# Patient Record
Sex: FEMALE | URBAN - METROPOLITAN AREA
[De-identification: names, ages, dates, MRNs, and addresses within clinical notes are randomized per-mention and may not be internally consistent; named-entity substitution may affect disease eponyms.]

---

## 2024-02-07 ENCOUNTER — TELEPHONE (OUTPATIENT)
Dept: OBGYN CLINIC | Facility: CLINIC | Age: 34
End: 2024-02-07

## 2024-02-07 NOTE — TELEPHONE ENCOUNTER
Patient's friend called, new patient says she is pregnant. Does not know lmp but was in December.

## 2024-02-08 NOTE — TELEPHONE ENCOUNTER
Patient prefers to come to [place] office. She has no insurance and is self pay. Patient was refusing to go to [facility] at first but took down their phone number because it will be a more cost effective option for her. She still wants to be called if we have something available in the [place] office. Per patient her LMP is 12/15/23.

## 2024-02-15 ENCOUNTER — HOSPITAL ENCOUNTER (EMERGENCY)
Facility: HOSPITAL | Age: 34
Discharge: HOME | End: 2024-02-15
Attending: EMERGENCY MEDICINE
Payer: COMMERCIAL

## 2024-02-15 ENCOUNTER — APPOINTMENT (EMERGENCY)
Dept: RADIOLOGY | Facility: HOSPITAL | Age: 34
End: 2024-02-15
Payer: COMMERCIAL

## 2024-02-15 VITALS
TEMPERATURE: 98.9 F | HEART RATE: 82 BPM | OXYGEN SATURATION: 98 % | SYSTOLIC BLOOD PRESSURE: 120 MMHG | WEIGHT: 182 LBS | RESPIRATION RATE: 18 BRPM | DIASTOLIC BLOOD PRESSURE: 76 MMHG

## 2024-02-15 DIAGNOSIS — O46.8X1 SUBCHORIONIC HEMORRHAGE IN FIRST TRIMESTER: ICD-10-CM

## 2024-02-15 DIAGNOSIS — O20.0 THREATENED MISCARRIAGE IN EARLY PREGNANCY: ICD-10-CM

## 2024-02-15 DIAGNOSIS — O46.90 VAGINAL BLEEDING DURING PREGNANCY: Primary | ICD-10-CM

## 2024-02-15 DIAGNOSIS — O41.8X10 SUBCHORIONIC HEMORRHAGE IN FIRST TRIMESTER: ICD-10-CM

## 2024-02-15 LAB
ABO GROUP BLD: NORMAL
B-HCG SERPL-ACNC: ABNORMAL MIU/ML (ref 0–5)
BACTERIA UR QL AUTO: ABNORMAL /HPF
BASOPHILS # BLD AUTO: 0.12 THOUSANDS/ÂΜL (ref 0–0.1)
BASOPHILS NFR BLD AUTO: 1 % (ref 0–1)
BILIRUB UR QL STRIP: NEGATIVE
CLARITY UR: ABNORMAL
COLOR UR: YELLOW
EOSINOPHIL # BLD AUTO: 0.53 THOUSAND/ÂΜL (ref 0–0.61)
EOSINOPHIL NFR BLD AUTO: 5 % (ref 0–6)
ERYTHROCYTE [DISTWIDTH] IN BLOOD BY AUTOMATED COUNT: 15.1 % (ref 11.6–15.1)
EXT PREGNANCY TEST URINE: POSITIVE
EXT. CONTROL: ABNORMAL
GLUCOSE UR STRIP-MCNC: NEGATIVE MG/DL
HCT VFR BLD AUTO: 37.3 % (ref 34.8–46.1)
HGB BLD-MCNC: 12.9 G/DL (ref 11.5–15.4)
HGB UR QL STRIP.AUTO: ABNORMAL
IMM GRANULOCYTES # BLD AUTO: 0.04 THOUSAND/UL (ref 0–0.2)
IMM GRANULOCYTES NFR BLD AUTO: 0 % (ref 0–2)
KETONES UR STRIP-MCNC: ABNORMAL MG/DL
LEUKOCYTE ESTERASE UR QL STRIP: NEGATIVE
LYMPHOCYTES # BLD AUTO: 2.38 THOUSANDS/ÂΜL (ref 0.6–4.47)
LYMPHOCYTES NFR BLD AUTO: 23 % (ref 14–44)
MCH RBC QN AUTO: 28 PG (ref 26.8–34.3)
MCHC RBC AUTO-ENTMCNC: 34.6 G/DL (ref 31.4–37.4)
MCV RBC AUTO: 81 FL (ref 82–98)
MONOCYTES # BLD AUTO: 0.86 THOUSAND/ÂΜL (ref 0.17–1.22)
MONOCYTES NFR BLD AUTO: 8 % (ref 4–12)
MUCOUS THREADS UR QL AUTO: ABNORMAL
NEUTROPHILS # BLD AUTO: 6.39 THOUSANDS/ÂΜL (ref 1.85–7.62)
NEUTS SEG NFR BLD AUTO: 63 % (ref 43–75)
NITRITE UR QL STRIP: NEGATIVE
NON-SQ EPI CELLS URNS QL MICRO: ABNORMAL /HPF
NRBC BLD AUTO-RTO: 0 /100 WBCS
PH UR STRIP.AUTO: 6 [PH]
PLATELET # BLD AUTO: 238 THOUSANDS/UL (ref 149–390)
PMV BLD AUTO: 11 FL (ref 8.9–12.7)
PROT UR STRIP-MCNC: NEGATIVE MG/DL
RBC # BLD AUTO: 4.61 MILLION/UL (ref 3.81–5.12)
RBC #/AREA URNS AUTO: ABNORMAL /HPF
RH BLD: POSITIVE
SP GR UR STRIP.AUTO: 1.02 (ref 1–1.03)
UROBILINOGEN UR QL STRIP.AUTO: 0.2 E.U./DL
WBC # BLD AUTO: 10.32 THOUSAND/UL (ref 4.31–10.16)
WBC #/AREA URNS AUTO: ABNORMAL /HPF

## 2024-02-15 PROCEDURE — 99284 EMERGENCY DEPT VISIT MOD MDM: CPT

## 2024-02-15 PROCEDURE — 36415 COLL VENOUS BLD VENIPUNCTURE: CPT | Performed by: EMERGENCY MEDICINE

## 2024-02-15 PROCEDURE — 86901 BLOOD TYPING SEROLOGIC RH(D): CPT | Performed by: EMERGENCY MEDICINE

## 2024-02-15 PROCEDURE — 81001 URINALYSIS AUTO W/SCOPE: CPT | Performed by: EMERGENCY MEDICINE

## 2024-02-15 PROCEDURE — 81025 URINE PREGNANCY TEST: CPT | Performed by: EMERGENCY MEDICINE

## 2024-02-15 PROCEDURE — 86900 BLOOD TYPING SEROLOGIC ABO: CPT | Performed by: EMERGENCY MEDICINE

## 2024-02-15 PROCEDURE — 96360 HYDRATION IV INFUSION INIT: CPT

## 2024-02-15 PROCEDURE — 85025 COMPLETE CBC W/AUTO DIFF WBC: CPT | Performed by: EMERGENCY MEDICINE

## 2024-02-15 PROCEDURE — 99284 EMERGENCY DEPT VISIT MOD MDM: CPT | Performed by: EMERGENCY MEDICINE

## 2024-02-15 PROCEDURE — 84702 CHORIONIC GONADOTROPIN TEST: CPT | Performed by: EMERGENCY MEDICINE

## 2024-02-15 PROCEDURE — 76801 OB US < 14 WKS SINGLE FETUS: CPT

## 2024-02-15 RX ADMIN — SODIUM CHLORIDE 1000 ML: 0.9 INJECTION, SOLUTION INTRAVENOUS at 17:31

## 2024-02-15 NOTE — ED PROVIDER NOTES
History  Chief Complaint   Patient presents with    Vaginal Bleeding - Pregnant     Pt is approx. 8 weeks pregnant and is having vaginal bleeding. She had some bleeding last week as well. C/o abdominal cramping.      34 yo female  at 8 and 6/7 by LMP approx. 12/15/2023.  Pt. C/o episode of mild vaginal bleeding last week x one day and then again today but worse.  + associated mild lower abdominal cramps off and on.  No fever, cough, vomiting, dizziness, passing out.  + feels weak and tired.  Has not seen OB yet but did call for appointment it appears on EPIC.      History provided by:  Patient   used: Yes        None       History reviewed. No pertinent past medical history.    History reviewed. No pertinent surgical history.    History reviewed. No pertinent family history.  I have reviewed and agree with the history as documented.    E-Cigarette/Vaping    E-Cigarette Use Never User      E-Cigarette/Vaping Substances    Nicotine No     THC No     CBD No     Flavoring No     Other No     Unknown No      Social History     Tobacco Use    Smoking status: Never    Smokeless tobacco: Never   Vaping Use    Vaping status: Never Used   Substance Use Topics    Alcohol use: Never    Drug use: Never       Review of Systems   Constitutional:  Positive for fatigue. Negative for fever.   Respiratory:  Negative for cough.    Gastrointestinal:  Negative for diarrhea and vomiting.   Genitourinary:  Positive for pelvic pain and vaginal bleeding.   Skin:  Negative for rash.   Neurological:  Positive for weakness. Negative for dizziness and syncope.       Physical Exam  Physical Exam  Vitals and nursing note reviewed.   Constitutional:       General: She is not in acute distress.     Appearance: She is well-developed. She is not ill-appearing or diaphoretic.   HENT:      Head: Normocephalic and atraumatic.   Eyes:      General: No scleral icterus.     Conjunctiva/sclera: Conjunctivae normal.   Cardiovascular:       Rate and Rhythm: Normal rate and regular rhythm.      Heart sounds: Normal heart sounds. No murmur heard.  Pulmonary:      Effort: Pulmonary effort is normal. No respiratory distress.      Breath sounds: Normal breath sounds.   Abdominal:      General: Bowel sounds are normal. There is no distension.      Palpations: Abdomen is soft.      Tenderness: There is abdominal tenderness.      Comments: Very mild ttp suprapubic   Musculoskeletal:         General: No deformity. Normal range of motion.      Cervical back: Normal range of motion and neck supple.      Right lower leg: No edema.      Left lower leg: No edema.   Skin:     General: Skin is warm and dry.      Coloration: Skin is not pale.      Findings: No rash.   Neurological:      Mental Status: She is alert.      Cranial Nerves: No cranial nerve deficit.   Psychiatric:         Behavior: Behavior normal.         Vital Signs  ED Triage Vitals [02/15/24 1546]   Temperature Pulse Respirations Blood Pressure SpO2   98.9 °F (37.2 °C) (!) 110 20 126/77 98 %      Temp src Heart Rate Source Patient Position - Orthostatic VS BP Location FiO2 (%)   -- -- -- -- --      Pain Score       4           Vitals:    02/15/24 1546 02/15/24 1930 02/15/24 2032   BP: 126/77 123/63 120/76   Pulse: (!) 110 94 82         Visual Acuity      ED Medications  Medications   sodium chloride 0.9 % bolus 1,000 mL (0 mL Intravenous Stopped 2/15/24 1831)       Diagnostic Studies  Results Reviewed       Procedure Component Value Units Date/Time    Urine Microscopic [[id number]]  (Abnormal) Collected: 02/15/24 1732    Lab Status: Final result Specimen: Urine, Clean Catch Updated: 02/15/24 1840     RBC, UA 30-50 /hpf      WBC, UA 0-1 /hpf      Epithelial Cells Occasional /hpf      Bacteria, UA Occasional /hpf      MUCUS THREADS Occasional    Quantitative hCG [[id number]]  (Abnormal) Collected: 02/15/24 1729    Lab Status: Final result Specimen: Blood from Arm, Left Updated: 02/15/24 1823     HCG,  Quant 192,488 mIU/mL     Narrative:       Expected Ranges:    HCG results between 5 and 25 mIU/mL may be indicative of early pregnancy but should be interpreted in light of the total clinical presentation.    HCG can rise to detectable levels in janay and post menopausal women (0-11.6 mIU/mL).     Approximate               Approximate HCG  Gestation age          Concentration ( mIU/mL)  _____________          ______________________   Weeks                      HCG values  0.2-1                       5-50  1-2                           2-3                         100-5000  3-4                         500-82918  4-5                         1000-14775  5-6                         [id number]  6-8                         52090-176724  8-12                        [id number]      POCT pregnancy, urine [[id number]]  (Abnormal) Resulted: 02/15/24 1740    Lab Status: Final result Updated: 02/15/24 1740     EXT Preg Test, Ur Positive     Control Valid    UA (URINE) with reflex to Scope [[id number]]  (Abnormal) Collected: 02/15/24 1732    Lab Status: Final result Specimen: Urine, Clean Catch Updated: 02/15/24 1740     Color, UA Yellow     Clarity, UA Slightly Cloudy     Specific Gravity, UA 1.025     pH, UA 6.0     Leukocytes, UA Negative     Nitrite, UA Negative     Protein, UA Negative mg/dl      Glucose, UA Negative mg/dl      Ketones, UA >=80 (3+) mg/dl      Urobilinogen, UA 0.2 E.U./dl      Bilirubin, UA Negative     Occult Blood, UA Large    CBC and differential [[id number]]  (Abnormal) Collected: 02/15/24 1729    Lab Status: Final result Specimen: Blood from Arm, Left Updated: 02/15/24 1738     WBC 10.32 Thousand/uL      RBC 4.61 Million/uL      Hemoglobin 12.9 g/dL      Hematocrit 37.3 %      MCV 81 fL      MCH 28.0 pg      MCHC 34.6 g/dL      RDW 15.1 %      MPV 11.0 fL      Platelets 238 Thousands/uL      nRBC 0 /100 WBCs      Neutrophils Relative 63 %      Immat GRANS % 0 %      Lymphocytes Relative 23 %       Monocytes Relative 8 %      Eosinophils Relative 5 %      Basophils Relative 1 %      Neutrophils Absolute 6.39 Thousands/µL      Immature Grans Absolute 0.04 Thousand/uL      Lymphocytes Absolute 2.38 Thousands/µL      Monocytes Absolute 0.86 Thousand/µL      Eosinophils Absolute 0.53 Thousand/µL      Basophils Absolute 0.12 Thousands/µL                    US OB < 14 weeks with transvaginal   Final Result by Angel Luis Marshall MD (02/15 2005)      Single live intrauterine gestation at 8 weeks 2 days (range +/- 5 days).      MATTHEW of 09/24/2024.      Two subchorionic hemorrhages, which may present two components of a single long thin subchorionic hemorrhage with the longest measurement of approximately 7 cm.         Workstation performed: [id number]                    Procedures  Procedures         ED Course  ED Course as of 02/15/24 2056   Thu Feb 15, 2024   1845 Pt. Waiting for ultrasound to be done.                                 SBIRT 20yo+      Flowsheet Row Most Recent Value   Initial Alcohol Screen: US AUDIT-C     1. How often do you have a drink containing alcohol? 0 Filed at: 02/15/2024 1549   2. How many drinks containing alcohol do you have on a typical day you are drinking?  0 Filed at: 02/15/2024 1549   3a. Male UNDER 65: How often do you have five or more drinks on one occasion? 0 Filed at: 02/15/2024 1549   3b. FEMALE Any Age, or MALE 65+: How often do you have 4 or more drinks on one occassion? 0 Filed at: 02/15/2024 1549   Audit-C Score 0 Filed at: 02/15/2024 1549                      Medical Decision Making  1810 - US ordered, quant pending, WBC/hemoglobin ok, UA with ketones - getting IVF.  She is O positive so will not need Rhogam.    Quant good, US report shows live IUP with subchorionic hemorrhage.  Pt. Advised rest, nothing in the vagina and follow up outpt. OB.    Amount and/or Complexity of Data Reviewed  Labs: ordered.  Radiology: ordered.             Disposition  Final diagnoses:   Vaginal  bleeding during pregnancy   Subchorionic hemorrhage in first trimester   Threatened miscarriage in early pregnancy     Time reflects when diagnosis was documented in both MDM as applicable and the Disposition within this note       Time User Action Codes Description Comment    2/15/2024  6:16 PM Margoth Jain Add [O46.90] Vaginal bleeding during pregnancy     2/15/2024  8:16 PM Margoth Jain Add [O41.8X10,  O46.8X1] Subchorionic hemorrhage in first trimester     2/15/2024  8:17 PM Margoth Jain Add [O20.0] Threatened miscarriage in early pregnancy           ED Disposition       ED Disposition   Discharge    Condition   Stable    Date/Time   Thu Feb 15, 2024 2016    Comment   Buffy Pope discharge to home/self care.                   Follow-up Information       Follow up With Specialties Details Why Contact Info    Franchesca Tillman MD Obstetrics and Gynecology, Obstetrics, Gynecology Schedule an appointment as soon as possible for a visit   [address] [place] [address] [id number] [phone]              There are no discharge medications for this patient.      No discharge procedures on file.    PDMP Review       None            ED Provider  Electronically Signed by             Margoth Jain MD  02/15/24 6399

## 2024-02-16 NOTE — DISCHARGE INSTRUCTIONS
Right now your blood work is good and the baby is ok and in the uterus.  There is some bleeding in the pregnancy which puts you at a little higher risk to miscarry but most pregnancies with this are fine.  You do need to see an OB doctor - please call and get an appointment.  In the meantime, rest, no heavy lifting or exertion, nothing in the vagina.

## 2024-10-01 ENCOUNTER — HOSPITAL ENCOUNTER (INPATIENT)
Facility: HOSPITAL | Age: 34
LOS: 4 days | Discharge: HOME | End: 2024-10-05
Attending: OBSTETRICS & GYNECOLOGY | Admitting: STUDENT IN AN ORGANIZED HEALTH CARE EDUCATION/TRAINING PROGRAM

## 2024-10-01 ENCOUNTER — ANESTHESIA (INPATIENT)
Dept: LABOR AND DELIVERY | Facility: HOSPITAL | Age: 34
End: 2024-10-01

## 2024-10-01 ENCOUNTER — APPOINTMENT (INPATIENT)
Dept: CT IMAGING | Facility: HOSPITAL | Age: 34
End: 2024-10-01

## 2024-10-01 ENCOUNTER — APPOINTMENT (INPATIENT)
Dept: RADIOLOGY | Facility: HOSPITAL | Age: 34
End: 2024-10-01

## 2024-10-01 ENCOUNTER — ANESTHESIA EVENT (INPATIENT)
Dept: PERIOP | Facility: HOSPITAL | Age: 34
End: 2024-10-01

## 2024-10-01 ENCOUNTER — HOSPITAL ENCOUNTER (EMERGENCY)
Facility: HOSPITAL | Age: 34
End: 2024-10-01
Attending: EMERGENCY MEDICINE
Payer: COMMERCIAL

## 2024-10-01 ENCOUNTER — ANESTHESIA (INPATIENT)
Dept: PERIOP | Facility: HOSPITAL | Age: 34
End: 2024-10-01

## 2024-10-01 ENCOUNTER — ANESTHESIA EVENT (INPATIENT)
Dept: LABOR AND DELIVERY | Facility: HOSPITAL | Age: 34
End: 2024-10-01

## 2024-10-01 VITALS
RESPIRATION RATE: 20 BRPM | DIASTOLIC BLOOD PRESSURE: 94 MMHG | HEART RATE: 105 BPM | OXYGEN SATURATION: 98 % | SYSTOLIC BLOOD PRESSURE: 144 MMHG

## 2024-10-01 DIAGNOSIS — Z90.711 STATUS POST ABDOMINAL SUPRACERVICAL SUBTOTAL HYSTERECTOMY: ICD-10-CM

## 2024-10-01 DIAGNOSIS — Z98.891 STATUS POST PRIMARY LOW TRANSVERSE CESAREAN SECTION: ICD-10-CM

## 2024-10-01 DIAGNOSIS — Z34.90 PREGNANCY: Primary | ICD-10-CM

## 2024-10-01 DIAGNOSIS — R58 HEMORRHAGE: ICD-10-CM

## 2024-10-01 DIAGNOSIS — O32.2XX0: ICD-10-CM

## 2024-10-01 PROBLEM — D62 ACUTE BLOOD LOSS ANEMIA: Status: ACTIVE | Noted: 2024-10-01

## 2024-10-01 PROBLEM — O09.30 NO PRENATAL CARE IN CURRENT PREGNANCY: Status: ACTIVE | Noted: 2024-10-01

## 2024-10-01 LAB
ABO GROUP BLD BPU: NORMAL
ABO GROUP BLD: NORMAL
ALBUMIN SERPL BCG-MCNC: 2.7 G/DL (ref 3.5–5)
ALBUMIN SERPL BCG-MCNC: 3.3 G/DL (ref 3.5–5)
ALP SERPL-CCNC: 188 U/L (ref 34–104)
ALP SERPL-CCNC: 89 U/L (ref 34–104)
ALT SERPL W P-5'-P-CCNC: 10 U/L (ref 7–52)
ALT SERPL W P-5'-P-CCNC: 8 U/L (ref 7–52)
ANION GAP SERPL CALCULATED.3IONS-SCNC: 12 MMOL/L (ref 4–13)
ANION GAP SERPL CALCULATED.3IONS-SCNC: 6 MMOL/L (ref 4–13)
ANISOCYTOSIS BLD QL SMEAR: PRESENT
APTT PPP: 26 SECONDS (ref 23–34)
AST SERPL W P-5'-P-CCNC: 15 U/L (ref 13–39)
AST SERPL W P-5'-P-CCNC: 21 U/L (ref 13–39)
BASE EXCESS BLDA CALC-SCNC: -12 MMOL/L (ref -2–3)
BASE EXCESS BLDA CALC-SCNC: -13 MMOL/L (ref -2–3)
BASE EXCESS BLDA CALC-SCNC: -4.5 MMOL/L
BASE EXCESS BLDCOA CALC-SCNC: -4.1 MMOL/L (ref 3–11)
BASE EXCESS BLDCOV CALC-SCNC: -7.8 MMOL/L (ref 1–9)
BASOPHILS # BLD AUTO: 0.07 THOUSANDS/ΜL (ref 0–0.1)
BASOPHILS # BLD MANUAL: 0 THOUSAND/UL (ref 0–0.1)
BASOPHILS NFR BLD AUTO: 1 % (ref 0–1)
BASOPHILS NFR MAR MANUAL: 0 % (ref 0–1)
BILIRUB SERPL-MCNC: 0.43 MG/DL (ref 0.2–1)
BILIRUB SERPL-MCNC: 1.05 MG/DL (ref 0.2–1)
BLD GP AB SCN SERPL QL: NEGATIVE
BPU ID: NORMAL
BUN SERPL-MCNC: 10 MG/DL (ref 5–25)
BUN SERPL-MCNC: 9 MG/DL (ref 5–25)
CA-I BLD-SCNC: 0.55 MMOL/L (ref 1.12–1.32)
CA-I BLD-SCNC: 0.91 MMOL/L (ref 1.12–1.32)
CA-I BLD-SCNC: 1.16 MMOL/L (ref 1.12–1.32)
CA-I BLD-SCNC: 1.17 MMOL/L (ref 1.12–1.32)
CA-I BLD-SCNC: 1.2 MMOL/L (ref 1.12–1.32)
CALCIUM ALBUM COR SERPL-MCNC: 9.2 MG/DL (ref 8.3–10.1)
CALCIUM ALBUM COR SERPL-MCNC: 9.4 MG/DL (ref 8.3–10.1)
CALCIUM SERPL-MCNC: 8.4 MG/DL (ref 8.4–10.2)
CALCIUM SERPL-MCNC: 8.6 MG/DL (ref 8.4–10.2)
CFFMA (FUNCTIONAL FIBRINOGEN MAX AMPLITUDE): 17.1 MM (ref 15–32)
CFFMA (FUNCTIONAL FIBRINOGEN MAX AMPLITUDE): 19.2 MM (ref 15–32)
CHLORIDE SERPL-SCNC: 105 MMOL/L (ref 96–108)
CHLORIDE SERPL-SCNC: 110 MMOL/L (ref 96–108)
CKLY30: 0 % (ref 0–2.6)
CKLY30: 0 % (ref 0–2.6)
CKR(REACTION TIME): 4.2 MIN (ref 4.6–9.1)
CKR(REACTION TIME): 4.6 MIN (ref 4.6–9.1)
CO2 SERPL-SCNC: 17 MMOL/L (ref 21–32)
CO2 SERPL-SCNC: 20 MMOL/L (ref 21–32)
CREAT SERPL-MCNC: 0.51 MG/DL (ref 0.6–1.3)
CREAT SERPL-MCNC: 0.59 MG/DL (ref 0.6–1.3)
CROSSMATCH: NORMAL
CRTMA(RAPIDTEG MAX AMPLITUDE): 58.5 MM (ref 52–70)
CRTMA(RAPIDTEG MAX AMPLITUDE): 60.9 MM (ref 52–70)
EOSINOPHIL # BLD AUTO: 0.27 THOUSAND/ΜL (ref 0–0.61)
EOSINOPHIL # BLD MANUAL: 0 THOUSAND/UL (ref 0–0.4)
EOSINOPHIL NFR BLD AUTO: 3 % (ref 0–6)
EOSINOPHIL NFR BLD MANUAL: 0 % (ref 0–6)
ERYTHROCYTE [DISTWIDTH] IN BLOOD BY AUTOMATED COUNT: 16 % (ref 11.6–15.1)
ERYTHROCYTE [DISTWIDTH] IN BLOOD BY AUTOMATED COUNT: 16.3 % (ref 11.6–15.1)
ERYTHROCYTE [DISTWIDTH] IN BLOOD BY AUTOMATED COUNT: 16.9 % (ref 11.6–15.1)
ERYTHROCYTE [DISTWIDTH] IN BLOOD BY AUTOMATED COUNT: 18.2 % (ref 11.6–15.1)
ERYTHROCYTE [DISTWIDTH] IN BLOOD BY AUTOMATED COUNT: 18.2 % (ref 11.6–15.1)
FIBRINOGEN PPP-MCNC: 284 MG/DL (ref 206–523)
FIBRINOGEN PPP-MCNC: 298 MG/DL (ref 206–523)
FIBRINOGEN PPP-MCNC: 318 MG/DL (ref 206–523)
GFR SERPL CREATININE-BSD FRML MDRD: 119 ML/MIN/1.73SQ M
GFR SERPL CREATININE-BSD FRML MDRD: 125 ML/MIN/1.73SQ M
GLUCOSE SERPL-MCNC: 117 MG/DL (ref 65–140)
GLUCOSE SERPL-MCNC: 163 MG/DL (ref 65–140)
GLUCOSE SERPL-MCNC: 200 MG/DL (ref 65–140)
GLUCOSE SERPL-MCNC: 88 MG/DL (ref 65–140)
GLUCOSE SERPL-MCNC: 96 MG/DL (ref 65–140)
HBV SURFACE AB SER-ACNC: <3 MIU/ML
HBV SURFACE AG SER QL: NORMAL
HCO3 BLDA-SCNC: 13.9 MMOL/L (ref 22–28)
HCO3 BLDA-SCNC: 14.8 MMOL/L (ref 22–28)
HCO3 BLDA-SCNC: 19.6 MMOL/L (ref 22–28)
HCO3 BLDCOA-SCNC: 20.9 MMOL/L (ref 17.3–27.3)
HCO3 BLDCOV-SCNC: 21.1 MMOL/L (ref 12.2–28.6)
HCT VFR BLD AUTO: 22.7 % (ref 34.8–46.1)
HCT VFR BLD AUTO: 24.2 % (ref 34.8–46.1)
HCT VFR BLD AUTO: 26.1 % (ref 34.8–46.1)
HCT VFR BLD AUTO: 26.2 % (ref 34.8–46.1)
HCT VFR BLD AUTO: 29.8 % (ref 34.8–46.1)
HCT VFR BLD AUTO: 30.5 % (ref 34.8–46.1)
HCT VFR BLD CALC: 24 % (ref 34.8–46.1)
HCT VFR BLD CALC: 25 % (ref 34.8–46.1)
HCT VFR BLD CALC: <15 % (ref 34.8–46.1)
HCV AB SER QL: NORMAL
HGB BLD-MCNC: 10.5 G/DL (ref 11.5–15.4)
HGB BLD-MCNC: 6.3 G/DL (ref 11.5–15.4)
HGB BLD-MCNC: 7 G/DL (ref 11.5–15.4)
HGB BLD-MCNC: 7.4 G/DL (ref 11.5–15.4)
HGB BLD-MCNC: 9 G/DL (ref 11.5–15.4)
HGB BLD-MCNC: 9.2 G/DL (ref 11.5–15.4)
HGB BLD-MCNC: 9.5 G/DL (ref 11.5–15.4)
HGB BLDA-MCNC: 8.2 G/DL (ref 11.5–15.4)
HGB BLDA-MCNC: 8.5 G/DL (ref 11.5–15.4)
HIV 1+2 AB+HIV1 P24 AG SERPL QL IA: NORMAL
HIV 2 AB SERPL QL IA: NORMAL
HIV1 AB SERPL QL IA: NORMAL
HIV1 P24 AG SERPL QL IA: NORMAL
HOLD SPECIMEN: NORMAL
IMM GRANULOCYTES # BLD AUTO: 0.1 THOUSAND/UL (ref 0–0.2)
IMM GRANULOCYTES NFR BLD AUTO: 1 % (ref 0–2)
INR PPP: 1.02 (ref 0.85–1.19)
INR PPP: 1.07 (ref 0.85–1.19)
INR PPP: 1.08 (ref 0.85–1.19)
LACTATE SERPL-SCNC: 1.7 MMOL/L (ref 0.5–2)
LACTATE SERPL-SCNC: 1.9 MMOL/L (ref 0.5–2)
LACTATE SERPL-SCNC: 2.6 MMOL/L (ref 0.5–2)
LYMPHOCYTES # BLD AUTO: 1.8 THOUSAND/UL (ref 0.6–4.47)
LYMPHOCYTES # BLD AUTO: 15 % (ref 14–44)
LYMPHOCYTES # BLD AUTO: 2.73 THOUSANDS/ΜL (ref 0.6–4.47)
LYMPHOCYTES NFR BLD AUTO: 30 % (ref 14–44)
MAGNESIUM SERPL-MCNC: 1.5 MG/DL (ref 1.9–2.7)
MAGNESIUM SERPL-MCNC: 1.7 MG/DL (ref 1.9–2.7)
MCH RBC QN AUTO: 23.6 PG (ref 26.8–34.3)
MCH RBC QN AUTO: 23.7 PG (ref 26.8–34.3)
MCH RBC QN AUTO: 24.3 PG (ref 26.8–34.3)
MCH RBC QN AUTO: 28.8 PG (ref 26.8–34.3)
MCH RBC QN AUTO: 29.2 PG (ref 26.8–34.3)
MCHC RBC AUTO-ENTMCNC: 30.6 G/DL (ref 31.4–37.4)
MCHC RBC AUTO-ENTMCNC: 30.8 G/DL (ref 31.4–37.4)
MCHC RBC AUTO-ENTMCNC: 31.1 G/DL (ref 31.4–37.4)
MCHC RBC AUTO-ENTMCNC: 35.1 G/DL (ref 31.4–37.4)
MCHC RBC AUTO-ENTMCNC: 35.2 G/DL (ref 31.4–37.4)
MCV RBC AUTO: 76 FL (ref 82–98)
MCV RBC AUTO: 77 FL (ref 82–98)
MCV RBC AUTO: 79 FL (ref 82–98)
MCV RBC AUTO: 82 FL (ref 82–98)
MCV RBC AUTO: 83 FL (ref 82–98)
MONOCYTES # BLD AUTO: 0.48 THOUSAND/UL (ref 0–1.22)
MONOCYTES # BLD AUTO: 0.89 THOUSAND/ΜL (ref 0.17–1.22)
MONOCYTES NFR BLD AUTO: 10 % (ref 4–12)
MONOCYTES NFR BLD: 4 % (ref 4–12)
NEUTROPHILS # BLD AUTO: 5.16 THOUSANDS/ΜL (ref 1.85–7.62)
NEUTROPHILS # BLD MANUAL: 9.74 THOUSAND/UL (ref 1.85–7.62)
NEUTS BAND NFR BLD MANUAL: 3 % (ref 0–8)
NEUTS SEG NFR BLD AUTO: 55 % (ref 43–75)
NEUTS SEG NFR BLD AUTO: 78 % (ref 43–75)
NRBC BLD AUTO-RTO: 0 /100 WBCS
O2 CT BLDA-SCNC: 15.5 ML/DL (ref 16–23)
O2 CT VFR BLDCOA CALC: 12.3 ML/DL
OXYHGB MFR BLDA: 97.3 % (ref 94–97)
OXYHGB MFR BLDCOA: 56.4 %
OXYHGB MFR BLDCOV: 26.3 %
PCO2 BLD: 15 MMOL/L (ref 21–32)
PCO2 BLD: 16 MMOL/L (ref 21–32)
PCO2 BLD: 32.2 MM HG (ref 36–44)
PCO2 BLD: 42.6 MM HG (ref 36–44)
PCO2 BLD: <17 MM HG (ref 36–44)
PCO2 BLDA: 32.7 MM HG (ref 36–44)
PCO2 BLDCOA: 38.4 MM[HG] (ref 30–60)
PCO2 BLDCOV: 56.3 MM HG (ref 27–43)
PH BLD: 7.12 [PH] (ref 7.35–7.45)
PH BLD: 7.15 [PH] (ref 7.35–7.45)
PH BLD: 7.24 [PH] (ref 7.35–7.45)
PH BLDA: 7.39 [PH] (ref 7.35–7.45)
PH BLDCOA: 7.35 [PH] (ref 7.23–7.43)
PH BLDCOV: 7.19 [PH] (ref 7.19–7.49)
PHOSPHATE SERPL-MCNC: 3.2 MG/DL (ref 2.7–4.5)
PLATELET # BLD AUTO: 104 THOUSANDS/UL (ref 149–390)
PLATELET # BLD AUTO: 111 THOUSANDS/UL (ref 149–390)
PLATELET # BLD AUTO: 158 THOUSANDS/UL (ref 149–390)
PLATELET # BLD AUTO: 158 THOUSANDS/UL (ref 149–390)
PLATELET # BLD AUTO: 173 THOUSANDS/UL (ref 149–390)
PLATELET # BLD AUTO: 187 THOUSANDS/UL (ref 149–390)
PLATELET BLD QL SMEAR: ADEQUATE
PMV BLD AUTO: 11 FL (ref 8.9–12.7)
PMV BLD AUTO: 11.7 FL (ref 8.9–12.7)
PMV BLD AUTO: 12 FL (ref 8.9–12.7)
PMV BLD AUTO: 12.1 FL (ref 8.9–12.7)
PMV BLD AUTO: 12.1 FL (ref 8.9–12.7)
PMV BLD AUTO: 12.9 FL (ref 8.9–12.7)
PO2 BLD: 206 MM HG (ref 75–129)
PO2 BLD: 214 MM HG (ref 75–129)
PO2 BLD: 232 MM HG (ref 75–129)
PO2 BLDA: 99.5 MM HG (ref 75–129)
PO2 BLDCOA: 25 MM HG (ref 5–25)
PO2 BLDCOV: 16.4 MM HG (ref 15–45)
POLYCHROMASIA BLD QL SMEAR: PRESENT
POTASSIUM BLD-SCNC: 4.1 MMOL/L (ref 3.5–5.3)
POTASSIUM BLD-SCNC: 4.6 MMOL/L (ref 3.5–5.3)
POTASSIUM BLD-SCNC: <2 MMOL/L (ref 3.5–5.3)
POTASSIUM SERPL-SCNC: 3.7 MMOL/L (ref 3.5–5.3)
POTASSIUM SERPL-SCNC: 4.3 MMOL/L (ref 3.5–5.3)
PROT SERPL-MCNC: 4.7 G/DL (ref 6.4–8.4)
PROT SERPL-MCNC: 6.7 G/DL (ref 6.4–8.4)
PROTHROMBIN TIME: 14.2 SECONDS (ref 12.3–15)
PROTHROMBIN TIME: 14.6 SECONDS (ref 12.3–15)
PROTHROMBIN TIME: 14.8 SECONDS (ref 12.3–15)
RBC # BLD AUTO: 2.88 MILLION/UL (ref 3.81–5.12)
RBC # BLD AUTO: 3.13 MILLION/UL (ref 3.81–5.12)
RBC # BLD AUTO: 3.2 MILLION/UL (ref 3.81–5.12)
RBC # BLD AUTO: 3.6 MILLION/UL (ref 3.81–5.12)
RBC # BLD AUTO: 4.01 MILLION/UL (ref 3.81–5.12)
RBC MORPH BLD: PRESENT
RH BLD: POSITIVE
RUBV IGG SERPL IA-ACNC: 30.2 IU/ML
SAO2 % BLD FROM PO2: 100 % (ref 60–85)
SAO2 % BLD FROM PO2: 100 % (ref 60–85)
SAO2 % BLDCOV: 5.8 ML/DL
SODIUM BLD-SCNC: 136 MMOL/L (ref 136–145)
SODIUM BLD-SCNC: 138 MMOL/L (ref 136–145)
SODIUM BLD-SCNC: 149 MMOL/L (ref 136–145)
SODIUM SERPL-SCNC: 134 MMOL/L (ref 135–147)
SODIUM SERPL-SCNC: 136 MMOL/L (ref 135–147)
SPECIMEN EXPIRATION DATE: NORMAL
SPECIMEN SOURCE: ABNORMAL
THROMBIN TIME: 16.1 SECONDS (ref 14.7–18.4)
THROMBIN TIME: 16.1 SECONDS (ref 14.7–18.4)
TREPONEMA PALLIDUM IGG+IGM AB [PRESENCE] IN SERUM OR PLASMA BY IMMUNOASSAY: NORMAL
UNIT DISPENSE STATUS: NORMAL
UNIT PRODUCT CODE: NORMAL
UNIT PRODUCT VOLUME: 250 ML
UNIT PRODUCT VOLUME: 280 ML
UNIT PRODUCT VOLUME: 300 ML
UNIT PRODUCT VOLUME: 350 ML
UNIT RH: NORMAL
WBC # BLD AUTO: 10.26 THOUSAND/UL (ref 4.31–10.16)
WBC # BLD AUTO: 12.02 THOUSAND/UL (ref 4.31–10.16)
WBC # BLD AUTO: 12.4 THOUSAND/UL (ref 4.31–10.16)
WBC # BLD AUTO: 16.49 THOUSAND/UL (ref 4.31–10.16)
WBC # BLD AUTO: 9.22 THOUSAND/UL (ref 4.31–10.16)

## 2024-10-01 PROCEDURE — 85347 COAGULATION TIME ACTIVATED: CPT | Performed by: SURGERY

## 2024-10-01 PROCEDURE — 80053 COMPREHEN METABOLIC PANEL: CPT | Performed by: PHYSICIAN ASSISTANT

## 2024-10-01 PROCEDURE — 86900 BLOOD TYPING SEROLOGIC ABO: CPT

## 2024-10-01 PROCEDURE — NC001 PR NO CHARGE: Performed by: STUDENT IN AN ORGANIZED HEALTH CARE EDUCATION/TRAINING PROGRAM

## 2024-10-01 PROCEDURE — 87389 HIV-1 AG W/HIV-1&-2 AB AG IA: CPT | Performed by: STUDENT IN AN ORGANIZED HEALTH CARE EDUCATION/TRAINING PROGRAM

## 2024-10-01 PROCEDURE — 82803 BLOOD GASES ANY COMBINATION: CPT

## 2024-10-01 PROCEDURE — 85670 THROMBIN TIME PLASMA: CPT | Performed by: STUDENT IN AN ORGANIZED HEALTH CARE EDUCATION/TRAINING PROGRAM

## 2024-10-01 PROCEDURE — 85018 HEMOGLOBIN: CPT | Performed by: PHYSICIAN ASSISTANT

## 2024-10-01 PROCEDURE — 83605 ASSAY OF LACTIC ACID: CPT | Performed by: PHYSICIAN ASSISTANT

## 2024-10-01 PROCEDURE — 85384 FIBRINOGEN ACTIVITY: CPT | Performed by: STUDENT IN AN ORGANIZED HEALTH CARE EDUCATION/TRAINING PROGRAM

## 2024-10-01 PROCEDURE — 85014 HEMATOCRIT: CPT | Performed by: SURGERY

## 2024-10-01 PROCEDURE — 85730 THROMBOPLASTIN TIME PARTIAL: CPT | Performed by: STUDENT IN AN ORGANIZED HEALTH CARE EDUCATION/TRAINING PROGRAM

## 2024-10-01 PROCEDURE — 86780 TREPONEMA PALLIDUM: CPT | Performed by: STUDENT IN AN ORGANIZED HEALTH CARE EDUCATION/TRAINING PROGRAM

## 2024-10-01 PROCEDURE — 99222 1ST HOSP IP/OBS MODERATE 55: CPT | Performed by: STUDENT IN AN ORGANIZED HEALTH CARE EDUCATION/TRAINING PROGRAM

## 2024-10-01 PROCEDURE — 85397 CLOTTING FUNCT ACTIVITY: CPT | Performed by: SURGERY

## 2024-10-01 PROCEDURE — 80324 DRUG SCREEN AMPHETAMINES 1/2: CPT

## 2024-10-01 PROCEDURE — 80361 OPIATES 1 OR MORE: CPT

## 2024-10-01 PROCEDURE — 85576 BLOOD PLATELET AGGREGATION: CPT | Performed by: SURGERY

## 2024-10-01 PROCEDURE — 30233N1 TRANSFUSION OF NONAUTOLOGOUS RED BLOOD CELLS INTO PERIPHERAL VEIN, PERCUTANEOUS APPROACH: ICD-10-PCS | Performed by: PHYSICIAN ASSISTANT

## 2024-10-01 PROCEDURE — P9016 RBC LEUKOCYTES REDUCED: HCPCS

## 2024-10-01 PROCEDURE — 85347 COAGULATION TIME ACTIVATED: CPT | Performed by: PHYSICIAN ASSISTANT

## 2024-10-01 PROCEDURE — 85025 COMPLETE CBC W/AUTO DIFF WBC: CPT | Performed by: EMERGENCY MEDICINE

## 2024-10-01 PROCEDURE — 85014 HEMATOCRIT: CPT

## 2024-10-01 PROCEDURE — 86920 COMPATIBILITY TEST SPIN: CPT

## 2024-10-01 PROCEDURE — 85007 BL SMEAR W/DIFF WBC COUNT: CPT | Performed by: PHYSICIAN ASSISTANT

## 2024-10-01 PROCEDURE — 86762 RUBELLA ANTIBODY: CPT | Performed by: STUDENT IN AN ORGANIZED HEALTH CARE EDUCATION/TRAINING PROGRAM

## 2024-10-01 PROCEDURE — 0UT90ZL RESECTION OF UTERUS, SUPRACERVICAL, OPEN APPROACH: ICD-10-PCS | Performed by: OBSTETRICS & GYNECOLOGY

## 2024-10-01 PROCEDURE — 88304 TISSUE EXAM BY PATHOLOGIST: CPT | Performed by: PATHOLOGY

## 2024-10-01 PROCEDURE — 0W3R7ZZ CONTROL BLEEDING IN GENITOURINARY TRACT, VIA NATURAL OR ARTIFICIAL OPENING: ICD-10-PCS | Performed by: OBSTETRICS & GYNECOLOGY

## 2024-10-01 PROCEDURE — 0WJH0ZZ INSPECTION OF RETROPERITONEUM, OPEN APPROACH: ICD-10-PCS | Performed by: OBSTETRICS & GYNECOLOGY

## 2024-10-01 PROCEDURE — 58180 PARTIAL HYSTERECTOMY: CPT | Performed by: OBSTETRICS & GYNECOLOGY

## 2024-10-01 PROCEDURE — 96365 THER/PROPH/DIAG IV INF INIT: CPT

## 2024-10-01 PROCEDURE — P9017 PLASMA 1 DONOR FRZ W/IN 8 HR: HCPCS

## 2024-10-01 PROCEDURE — 87340 HEPATITIS B SURFACE AG IA: CPT | Performed by: STUDENT IN AN ORGANIZED HEALTH CARE EDUCATION/TRAINING PROGRAM

## 2024-10-01 PROCEDURE — 88307 TISSUE EXAM BY PATHOLOGIST: CPT | Performed by: PATHOLOGY

## 2024-10-01 PROCEDURE — 80053 COMPREHEN METABOLIC PANEL: CPT | Performed by: EMERGENCY MEDICINE

## 2024-10-01 PROCEDURE — 4A133J1 MONITORING OF ARTERIAL PULSE, PERIPHERAL, PERCUTANEOUS APPROACH: ICD-10-PCS | Performed by: NURSE ANESTHETIST, CERTIFIED REGISTERED

## 2024-10-01 PROCEDURE — 83735 ASSAY OF MAGNESIUM: CPT | Performed by: PHYSICIAN ASSISTANT

## 2024-10-01 PROCEDURE — 85576 BLOOD PLATELET AGGREGATION: CPT | Performed by: PHYSICIAN ASSISTANT

## 2024-10-01 PROCEDURE — 85610 PROTHROMBIN TIME: CPT | Performed by: PHYSICIAN ASSISTANT

## 2024-10-01 PROCEDURE — 86901 BLOOD TYPING SEROLOGIC RH(D): CPT

## 2024-10-01 PROCEDURE — 59514 CESAREAN DELIVERY ONLY: CPT | Performed by: STUDENT IN AN ORGANIZED HEALTH CARE EDUCATION/TRAINING PROGRAM

## 2024-10-01 PROCEDURE — 04LF0ZZ OCCLUSION OF LEFT INTERNAL ILIAC ARTERY, OPEN APPROACH: ICD-10-PCS | Performed by: OBSTETRICS & GYNECOLOGY

## 2024-10-01 PROCEDURE — 99291 CRITICAL CARE FIRST HOUR: CPT | Performed by: EMERGENCY MEDICINE

## 2024-10-01 PROCEDURE — 82805 BLOOD GASES W/O2 SATURATION: CPT | Performed by: PHYSICIAN ASSISTANT

## 2024-10-01 PROCEDURE — 71045 X-RAY EXAM CHEST 1 VIEW: CPT

## 2024-10-01 PROCEDURE — 82805 BLOOD GASES W/O2 SATURATION: CPT | Performed by: STUDENT IN AN ORGANIZED HEALTH CARE EDUCATION/TRAINING PROGRAM

## 2024-10-01 PROCEDURE — 82330 ASSAY OF CALCIUM: CPT

## 2024-10-01 PROCEDURE — 03HY32Z INSERTION OF MONITORING DEVICE INTO UPPER ARTERY, PERCUTANEOUS APPROACH: ICD-10-PCS | Performed by: NURSE ANESTHETIST, CERTIFIED REGISTERED

## 2024-10-01 PROCEDURE — 82330 ASSAY OF CALCIUM: CPT | Performed by: PHYSICIAN ASSISTANT

## 2024-10-01 PROCEDURE — 80359 METHYLENEDIOXYAMPHETAMINES: CPT

## 2024-10-01 PROCEDURE — 85027 COMPLETE CBC AUTOMATED: CPT | Performed by: PHYSICIAN ASSISTANT

## 2024-10-01 PROCEDURE — 85397 CLOTTING FUNCT ACTIVITY: CPT | Performed by: PHYSICIAN ASSISTANT

## 2024-10-01 PROCEDURE — 82947 ASSAY GLUCOSE BLOOD QUANT: CPT

## 2024-10-01 PROCEDURE — 80307 DRUG TEST PRSMV CHEM ANLYZR: CPT

## 2024-10-01 PROCEDURE — 96375 TX/PRO/DX INJ NEW DRUG ADDON: CPT

## 2024-10-01 PROCEDURE — 30233K1 TRANSFUSION OF NONAUTOLOGOUS FROZEN PLASMA INTO PERIPHERAL VEIN, PERCUTANEOUS APPROACH: ICD-10-PCS | Performed by: PHYSICIAN ASSISTANT

## 2024-10-01 PROCEDURE — 85049 AUTOMATED PLATELET COUNT: CPT | Performed by: STUDENT IN AN ORGANIZED HEALTH CARE EDUCATION/TRAINING PROGRAM

## 2024-10-01 PROCEDURE — 85018 HEMOGLOBIN: CPT | Performed by: SURGERY

## 2024-10-01 PROCEDURE — 99284 EMERGENCY DEPT VISIT MOD MDM: CPT

## 2024-10-01 PROCEDURE — 80346 BENZODIAZEPINES1-12: CPT

## 2024-10-01 PROCEDURE — 0TJB8ZZ INSPECTION OF BLADDER, VIA NATURAL OR ARTIFICIAL OPENING ENDOSCOPIC: ICD-10-PCS | Performed by: OBSTETRICS & GYNECOLOGY

## 2024-10-01 PROCEDURE — 84132 ASSAY OF SERUM POTASSIUM: CPT

## 2024-10-01 PROCEDURE — 85027 COMPLETE CBC AUTOMATED: CPT | Performed by: STUDENT IN AN ORGANIZED HEALTH CARE EDUCATION/TRAINING PROGRAM

## 2024-10-01 PROCEDURE — 85610 PROTHROMBIN TIME: CPT | Performed by: STUDENT IN AN ORGANIZED HEALTH CARE EDUCATION/TRAINING PROGRAM

## 2024-10-01 PROCEDURE — 36415 COLL VENOUS BLD VENIPUNCTURE: CPT | Performed by: EMERGENCY MEDICINE

## 2024-10-01 PROCEDURE — 83605 ASSAY OF LACTIC ACID: CPT | Performed by: SURGERY

## 2024-10-01 PROCEDURE — 86803 HEPATITIS C AB TEST: CPT | Performed by: STUDENT IN AN ORGANIZED HEALTH CARE EDUCATION/TRAINING PROGRAM

## 2024-10-01 PROCEDURE — 86850 RBC ANTIBODY SCREEN: CPT

## 2024-10-01 PROCEDURE — 83735 ASSAY OF MAGNESIUM: CPT | Performed by: EMERGENCY MEDICINE

## 2024-10-01 PROCEDURE — 84100 ASSAY OF PHOSPHORUS: CPT | Performed by: PHYSICIAN ASSISTANT

## 2024-10-01 PROCEDURE — 0UT70ZZ RESECTION OF BILATERAL FALLOPIAN TUBES, OPEN APPROACH: ICD-10-PCS | Performed by: OBSTETRICS & GYNECOLOGY

## 2024-10-01 PROCEDURE — P9035 PLATELET PHERES LEUKOREDUCED: HCPCS

## 2024-10-01 PROCEDURE — 37617 LIGATION MAJOR ARTERY ABD: CPT | Performed by: OBSTETRICS & GYNECOLOGY

## 2024-10-01 PROCEDURE — 86706 HEP B SURFACE ANTIBODY: CPT | Performed by: STUDENT IN AN ORGANIZED HEALTH CARE EDUCATION/TRAINING PROGRAM

## 2024-10-01 PROCEDURE — 85384 FIBRINOGEN ACTIVITY: CPT | Performed by: PHYSICIAN ASSISTANT

## 2024-10-01 PROCEDURE — 74174 CTA ABD&PLVS W/CONTRAST: CPT

## 2024-10-01 PROCEDURE — 85384 FIBRINOGEN ACTIVITY: CPT | Performed by: SURGERY

## 2024-10-01 PROCEDURE — 4A133B1 MONITORING OF ARTERIAL PRESSURE, PERIPHERAL, PERCUTANEOUS APPROACH: ICD-10-PCS | Performed by: NURSE ANESTHETIST, CERTIFIED REGISTERED

## 2024-10-01 PROCEDURE — 84295 ASSAY OF SERUM SODIUM: CPT

## 2024-10-01 RX ORDER — OXYCODONE HYDROCHLORIDE 5 MG/1
5 TABLET ORAL EVERY 4 HOURS PRN
Status: DISCONTINUED | OUTPATIENT
Start: 2024-10-02 | End: 2024-10-05 | Stop reason: HOSPADM

## 2024-10-01 RX ORDER — METOCLOPRAMIDE HYDROCHLORIDE 5 MG/ML
10 INJECTION INTRAMUSCULAR; INTRAVENOUS ONCE AS NEEDED
Status: DISCONTINUED | OUTPATIENT
Start: 2024-10-01 | End: 2024-10-01 | Stop reason: HOSPADM

## 2024-10-01 RX ORDER — SIMETHICONE 80 MG
80 TABLET,CHEWABLE ORAL 4 TIMES DAILY PRN
Status: CANCELLED | OUTPATIENT
Start: 2024-10-01

## 2024-10-01 RX ORDER — SODIUM CHLORIDE, SODIUM LACTATE, POTASSIUM CHLORIDE, CALCIUM CHLORIDE 600; 310; 30; 20 MG/100ML; MG/100ML; MG/100ML; MG/100ML
INJECTION, SOLUTION INTRAVENOUS CONTINUOUS PRN
Status: DISCONTINUED | OUTPATIENT
Start: 2024-10-01 | End: 2024-10-01

## 2024-10-01 RX ORDER — OXYCODONE HYDROCHLORIDE 10 MG/1
10 TABLET ORAL EVERY 4 HOURS PRN
Status: DISCONTINUED | OUTPATIENT
Start: 2024-10-01 | End: 2024-10-01

## 2024-10-01 RX ORDER — SODIUM CHLORIDE, SODIUM LACTATE, POTASSIUM CHLORIDE, CALCIUM CHLORIDE 600; 310; 30; 20 MG/100ML; MG/100ML; MG/100ML; MG/100ML
125 INJECTION, SOLUTION INTRAVENOUS CONTINUOUS
Status: DISCONTINUED | OUTPATIENT
Start: 2024-10-01 | End: 2024-10-01

## 2024-10-01 RX ORDER — LIDOCAINE HYDROCHLORIDE 10 MG/ML
INJECTION, SOLUTION EPIDURAL; INFILTRATION; INTRACAUDAL; PERINEURAL AS NEEDED
Status: DISCONTINUED | OUTPATIENT
Start: 2024-10-01 | End: 2024-10-01

## 2024-10-01 RX ORDER — CEFAZOLIN SODIUM 1 G/3ML
INJECTION, POWDER, FOR SOLUTION INTRAMUSCULAR; INTRAVENOUS AS NEEDED
Status: DISCONTINUED | OUTPATIENT
Start: 2024-10-01 | End: 2024-10-01

## 2024-10-01 RX ORDER — DOCUSATE SODIUM 100 MG/1
100 CAPSULE, LIQUID FILLED ORAL 2 TIMES DAILY
Status: CANCELLED | OUTPATIENT
Start: 2024-10-01

## 2024-10-01 RX ORDER — MISOPROSTOL 100 UG/1
TABLET ORAL AS NEEDED
Status: DISCONTINUED | OUTPATIENT
Start: 2024-10-01 | End: 2024-10-01 | Stop reason: HOSPADM

## 2024-10-01 RX ORDER — SODIUM CHLORIDE 9 MG/ML
INJECTION, SOLUTION INTRAVENOUS CONTINUOUS PRN
Status: DISCONTINUED | OUTPATIENT
Start: 2024-10-01 | End: 2024-10-01

## 2024-10-01 RX ORDER — OXYTOCIN/RINGER'S LACTATE 30/500 ML
62.5 PLASTIC BAG, INJECTION (ML) INTRAVENOUS ONCE
Status: COMPLETED | OUTPATIENT
Start: 2024-10-01 | End: 2024-10-01

## 2024-10-01 RX ORDER — DIPHENHYDRAMINE HCL 25 MG
25 TABLET ORAL EVERY 6 HOURS PRN
Status: DISCONTINUED | OUTPATIENT
Start: 2024-10-01 | End: 2024-10-05 | Stop reason: HOSPADM

## 2024-10-01 RX ORDER — HYDROMORPHONE HCL/PF 1 MG/ML
SYRINGE (ML) INJECTION AS NEEDED
Status: DISCONTINUED | OUTPATIENT
Start: 2024-10-01 | End: 2024-10-01

## 2024-10-01 RX ORDER — ENOXAPARIN SODIUM 100 MG/ML
40 INJECTION SUBCUTANEOUS
Status: DISCONTINUED | OUTPATIENT
Start: 2024-10-02 | End: 2024-10-01

## 2024-10-01 RX ORDER — OXYCODONE HYDROCHLORIDE 5 MG/1
5 TABLET ORAL EVERY 4 HOURS PRN
Status: DISCONTINUED | OUTPATIENT
Start: 2024-10-01 | End: 2024-10-01

## 2024-10-01 RX ORDER — BUPIVACAINE HYDROCHLORIDE 7.5 MG/ML
INJECTION, SOLUTION INTRASPINAL AS NEEDED
Status: DISCONTINUED | OUTPATIENT
Start: 2024-10-01 | End: 2024-10-01

## 2024-10-01 RX ORDER — FENTANYL CITRATE/PF 50 MCG/ML
50 SYRINGE (ML) INJECTION
Status: COMPLETED | OUTPATIENT
Start: 2024-10-01 | End: 2024-10-01

## 2024-10-01 RX ORDER — DIPHENHYDRAMINE HCL 25 MG
25 TABLET ORAL EVERY 6 HOURS PRN
Status: CANCELLED | OUTPATIENT
Start: 2024-10-01

## 2024-10-01 RX ORDER — ONDANSETRON 2 MG/ML
4 INJECTION INTRAMUSCULAR; INTRAVENOUS ONCE AS NEEDED
Status: DISCONTINUED | OUTPATIENT
Start: 2024-10-01 | End: 2024-10-01 | Stop reason: HOSPADM

## 2024-10-01 RX ORDER — POTASSIUM CHLORIDE 14.9 MG/ML
INJECTION INTRAVENOUS CONTINUOUS PRN
Status: DISCONTINUED | OUTPATIENT
Start: 2024-10-01 | End: 2024-10-01

## 2024-10-01 RX ORDER — OXYCODONE HYDROCHLORIDE 10 MG/1
10 TABLET ORAL EVERY 4 HOURS PRN
Status: DISCONTINUED | OUTPATIENT
Start: 2024-10-02 | End: 2024-10-05 | Stop reason: HOSPADM

## 2024-10-01 RX ORDER — OXYTOCIN/RINGER'S LACTATE 30/500 ML
62.5 PLASTIC BAG, INJECTION (ML) INTRAVENOUS ONCE
Status: CANCELLED | OUTPATIENT
Start: 2024-10-01 | End: 2024-10-01

## 2024-10-01 RX ORDER — ONDANSETRON 2 MG/ML
4 INJECTION INTRAMUSCULAR; INTRAVENOUS EVERY 8 HOURS PRN
Status: CANCELLED | OUTPATIENT
Start: 2024-10-01

## 2024-10-01 RX ORDER — OXYTOCIN/RINGER'S LACTATE 30/500 ML
PLASTIC BAG, INJECTION (ML) INTRAVENOUS CONTINUOUS PRN
Status: DISCONTINUED | OUTPATIENT
Start: 2024-10-01 | End: 2024-10-01

## 2024-10-01 RX ORDER — MAGNESIUM HYDROXIDE 1200 MG/15ML
LIQUID ORAL AS NEEDED
Status: DISCONTINUED | OUTPATIENT
Start: 2024-10-01 | End: 2024-10-01 | Stop reason: HOSPADM

## 2024-10-01 RX ORDER — NALBUPHINE HYDROCHLORIDE 10 MG/ML
5 INJECTION, SOLUTION INTRAMUSCULAR; INTRAVENOUS; SUBCUTANEOUS
Status: CANCELLED | OUTPATIENT
Start: 2024-10-01 | End: 2024-10-02

## 2024-10-01 RX ORDER — OXYCODONE HYDROCHLORIDE 10 MG/1
10 TABLET ORAL EVERY 4 HOURS PRN
Status: CANCELLED | OUTPATIENT
Start: 2024-10-01

## 2024-10-01 RX ORDER — KETOROLAC TROMETHAMINE 30 MG/ML
30 INJECTION, SOLUTION INTRAMUSCULAR; INTRAVENOUS EVERY 6 HOURS
Status: DISCONTINUED | OUTPATIENT
Start: 2024-10-01 | End: 2024-10-01

## 2024-10-01 RX ORDER — CALCIUM CARBONATE 500 MG/1
1000 TABLET, CHEWABLE ORAL DAILY PRN
Status: CANCELLED | OUTPATIENT
Start: 2024-10-01

## 2024-10-01 RX ORDER — METRONIDAZOLE 500 MG/1
500 TABLET ORAL EVERY 12 HOURS SCHEDULED
Status: DISPENSED | OUTPATIENT
Start: 2024-10-01 | End: 2024-10-03

## 2024-10-01 RX ORDER — NALOXONE HYDROCHLORIDE 0.4 MG/ML
0.1 INJECTION, SOLUTION INTRAMUSCULAR; INTRAVENOUS; SUBCUTANEOUS
Status: ACTIVE | OUTPATIENT
Start: 2024-10-01 | End: 2024-10-02

## 2024-10-01 RX ORDER — DEXAMETHASONE SODIUM PHOSPHATE 10 MG/ML
INJECTION, SOLUTION INTRAMUSCULAR; INTRAVENOUS AS NEEDED
Status: DISCONTINUED | OUTPATIENT
Start: 2024-10-01 | End: 2024-10-01

## 2024-10-01 RX ORDER — SODIUM CHLORIDE, SODIUM LACTATE, POTASSIUM CHLORIDE, CALCIUM CHLORIDE 600; 310; 30; 20 MG/100ML; MG/100ML; MG/100ML; MG/100ML
125 INJECTION, SOLUTION INTRAVENOUS CONTINUOUS
Status: DISCONTINUED | OUTPATIENT
Start: 2024-10-01 | End: 2024-10-02

## 2024-10-01 RX ORDER — ACETAMINOPHEN 325 MG/1
650 TABLET ORAL EVERY 6 HOURS SCHEDULED
Status: DISCONTINUED | OUTPATIENT
Start: 2024-10-01 | End: 2024-10-01

## 2024-10-01 RX ORDER — ONDANSETRON 2 MG/ML
4 INJECTION INTRAMUSCULAR; INTRAVENOUS EVERY 8 HOURS PRN
Status: DISCONTINUED | OUTPATIENT
Start: 2024-10-01 | End: 2024-10-01

## 2024-10-01 RX ORDER — CALCIUM CHLORIDE 100 MG/ML
INJECTION INTRAVENOUS; INTRAVENTRICULAR AS NEEDED
Status: DISCONTINUED | OUTPATIENT
Start: 2024-10-01 | End: 2024-10-01

## 2024-10-01 RX ORDER — ONDANSETRON 2 MG/ML
INJECTION INTRAMUSCULAR; INTRAVENOUS AS NEEDED
Status: DISCONTINUED | OUTPATIENT
Start: 2024-10-01 | End: 2024-10-01

## 2024-10-01 RX ORDER — HYDROMORPHONE HCL/PF 1 MG/ML
0.5 SYRINGE (ML) INJECTION
Status: DISCONTINUED | OUTPATIENT
Start: 2024-10-01 | End: 2024-10-01 | Stop reason: HOSPADM

## 2024-10-01 RX ORDER — KETOROLAC TROMETHAMINE 30 MG/ML
INJECTION, SOLUTION INTRAMUSCULAR; INTRAVENOUS AS NEEDED
Status: DISCONTINUED | OUTPATIENT
Start: 2024-10-01 | End: 2024-10-01

## 2024-10-01 RX ORDER — TRANEXAMIC ACID 10 MG/ML
INJECTION, SOLUTION INTRAVENOUS AS NEEDED
Status: DISCONTINUED | OUTPATIENT
Start: 2024-10-01 | End: 2024-10-01

## 2024-10-01 RX ORDER — SODIUM CHLORIDE 9 MG/ML
INJECTION, SOLUTION INTRAVENOUS AS NEEDED
Status: DISCONTINUED | OUTPATIENT
Start: 2024-10-01 | End: 2024-10-01 | Stop reason: HOSPADM

## 2024-10-01 RX ORDER — SODIUM CHLORIDE, SODIUM LACTATE, POTASSIUM CHLORIDE, CALCIUM CHLORIDE 600; 310; 30; 20 MG/100ML; MG/100ML; MG/100ML; MG/100ML
20 INJECTION, SOLUTION INTRAVENOUS CONTINUOUS
Status: DISCONTINUED | OUTPATIENT
Start: 2024-10-01 | End: 2024-10-01

## 2024-10-01 RX ORDER — MIDAZOLAM HYDROCHLORIDE 2 MG/2ML
INJECTION, SOLUTION INTRAMUSCULAR; INTRAVENOUS AS NEEDED
Status: DISCONTINUED | OUTPATIENT
Start: 2024-10-01 | End: 2024-10-01

## 2024-10-01 RX ORDER — ROCURONIUM BROMIDE 10 MG/ML
INJECTION, SOLUTION INTRAVENOUS AS NEEDED
Status: DISCONTINUED | OUTPATIENT
Start: 2024-10-01 | End: 2024-10-01

## 2024-10-01 RX ORDER — CALCIUM CARBONATE 500 MG/1
1000 TABLET, CHEWABLE ORAL DAILY PRN
Status: DISCONTINUED | OUTPATIENT
Start: 2024-10-01 | End: 2024-10-05 | Stop reason: HOSPADM

## 2024-10-01 RX ORDER — HYDROMORPHONE HCL/PF 1 MG/ML
0.5 SYRINGE (ML) INJECTION EVERY 2 HOUR PRN
Status: DISPENSED | OUTPATIENT
Start: 2024-10-01 | End: 2024-10-02

## 2024-10-01 RX ORDER — METHYLERGONOVINE MALEATE 0.2 MG/ML
INJECTION INTRAVENOUS AS NEEDED
Status: DISCONTINUED | OUTPATIENT
Start: 2024-10-01 | End: 2024-10-01

## 2024-10-01 RX ORDER — MAGNESIUM SULFATE HEPTAHYDRATE 40 MG/ML
INJECTION, SOLUTION INTRAVENOUS AS NEEDED
Status: DISCONTINUED | OUTPATIENT
Start: 2024-10-01 | End: 2024-10-01

## 2024-10-01 RX ORDER — ALBUMIN, HUMAN INJ 5% 5 %
SOLUTION INTRAVENOUS CONTINUOUS PRN
Status: DISCONTINUED | OUTPATIENT
Start: 2024-10-01 | End: 2024-10-01

## 2024-10-01 RX ORDER — CARBOPROST TROMETHAMINE 250 UG/ML
INJECTION, SOLUTION INTRAMUSCULAR AS NEEDED
Status: DISCONTINUED | OUTPATIENT
Start: 2024-10-01 | End: 2024-10-01

## 2024-10-01 RX ORDER — EPHEDRINE SULFATE 50 MG/ML
INJECTION INTRAVENOUS AS NEEDED
Status: DISCONTINUED | OUTPATIENT
Start: 2024-10-01 | End: 2024-10-01

## 2024-10-01 RX ORDER — FENTANYL CITRATE 50 UG/ML
INJECTION, SOLUTION INTRAMUSCULAR; INTRAVENOUS AS NEEDED
Status: DISCONTINUED | OUTPATIENT
Start: 2024-10-01 | End: 2024-10-01

## 2024-10-01 RX ORDER — OXYCODONE HYDROCHLORIDE 5 MG/1
5 TABLET ORAL EVERY 4 HOURS PRN
Status: CANCELLED | OUTPATIENT
Start: 2024-10-01

## 2024-10-01 RX ORDER — SODIUM CHLORIDE, SODIUM LACTATE, POTASSIUM CHLORIDE, CALCIUM CHLORIDE 600; 310; 30; 20 MG/100ML; MG/100ML; MG/100ML; MG/100ML
125 INJECTION, SOLUTION INTRAVENOUS CONTINUOUS
Status: CANCELLED | OUTPATIENT
Start: 2024-10-01

## 2024-10-01 RX ORDER — ACETAMINOPHEN 325 MG/1
650 TABLET ORAL EVERY 6 HOURS SCHEDULED
Status: COMPLETED | OUTPATIENT
Start: 2024-10-02 | End: 2024-10-05

## 2024-10-01 RX ORDER — CEFAZOLIN SODIUM 2 G/50ML
2000 SOLUTION INTRAVENOUS ONCE
Status: DISCONTINUED | OUTPATIENT
Start: 2024-10-01 | End: 2024-10-01

## 2024-10-01 RX ORDER — IBUPROFEN 400 MG/1
600 TABLET, FILM COATED ORAL EVERY 6 HOURS SCHEDULED
Status: DISCONTINUED | OUTPATIENT
Start: 2024-10-02 | End: 2024-10-01

## 2024-10-01 RX ORDER — ACETAMINOPHEN 325 MG/1
650 TABLET ORAL EVERY 6 HOURS SCHEDULED
Status: CANCELLED | OUTPATIENT
Start: 2024-10-02 | End: 2024-10-05

## 2024-10-01 RX ORDER — ONDANSETRON 2 MG/ML
4 INJECTION INTRAMUSCULAR; INTRAVENOUS EVERY 8 HOURS PRN
Status: DISCONTINUED | OUTPATIENT
Start: 2024-10-01 | End: 2024-10-05 | Stop reason: HOSPADM

## 2024-10-01 RX ORDER — PROPOFOL 10 MG/ML
INJECTION, EMULSION INTRAVENOUS AS NEEDED
Status: DISCONTINUED | OUTPATIENT
Start: 2024-10-01 | End: 2024-10-01

## 2024-10-01 RX ORDER — MAGNESIUM SULFATE HEPTAHYDRATE 40 MG/ML
2 INJECTION, SOLUTION INTRAVENOUS ONCE
Status: COMPLETED | OUTPATIENT
Start: 2024-10-01 | End: 2024-10-01

## 2024-10-01 RX ORDER — DOCUSATE SODIUM 100 MG/1
100 CAPSULE, LIQUID FILLED ORAL 2 TIMES DAILY
Status: DISCONTINUED | OUTPATIENT
Start: 2024-10-01 | End: 2024-10-05 | Stop reason: HOSPADM

## 2024-10-01 RX ORDER — MORPHINE SULFATE 0.5 MG/ML
INJECTION, SOLUTION EPIDURAL; INTRATHECAL; INTRAVENOUS AS NEEDED
Status: DISCONTINUED | OUTPATIENT
Start: 2024-10-01 | End: 2024-10-01

## 2024-10-01 RX ORDER — SIMETHICONE 80 MG
80 TABLET,CHEWABLE ORAL 4 TIMES DAILY PRN
Status: DISCONTINUED | OUTPATIENT
Start: 2024-10-01 | End: 2024-10-05 | Stop reason: HOSPADM

## 2024-10-01 RX ADMIN — ROCURONIUM BROMIDE 20 MG: 10 INJECTION INTRAVENOUS at 08:26

## 2024-10-01 RX ADMIN — MAGNESIUM SULFATE HEPTAHYDRATE 2 G: 40 INJECTION, SOLUTION INTRAVENOUS at 17:02

## 2024-10-01 RX ADMIN — HYDROMORPHONE HYDROCHLORIDE 0.5 MG: 1 INJECTION, SOLUTION INTRAMUSCULAR; INTRAVENOUS; SUBCUTANEOUS at 18:42

## 2024-10-01 RX ADMIN — PHENYLEPHRINE HYDROCHLORIDE 200 MCG: 50 INJECTION INTRAVENOUS at 03:48

## 2024-10-01 RX ADMIN — CEFAZOLIN 2000 MG: 1 INJECTION, POWDER, FOR SOLUTION INTRAMUSCULAR; INTRAVENOUS at 11:23

## 2024-10-01 RX ADMIN — IOHEXOL 100 ML: 350 INJECTION, SOLUTION INTRAVENOUS at 06:25

## 2024-10-01 RX ADMIN — SODIUM CHLORIDE, SODIUM LACTATE, POTASSIUM CHLORIDE, AND CALCIUM CHLORIDE: .6; .31; .03; .02 INJECTION, SOLUTION INTRAVENOUS at 04:00

## 2024-10-01 RX ADMIN — CALCIUM CHLORIDE 1 G: 100 INJECTION INTRAVENOUS; INTRAVENTRICULAR at 07:53

## 2024-10-01 RX ADMIN — DOCUSATE SODIUM 100 MG: 100 CAPSULE, LIQUID FILLED ORAL at 17:02

## 2024-10-01 RX ADMIN — ROCURONIUM BROMIDE 20 MG: 10 INJECTION INTRAVENOUS at 09:51

## 2024-10-01 RX ADMIN — CEPHALEXIN 500 MG: 250 CAPSULE ORAL at 20:48

## 2024-10-01 RX ADMIN — OXYTOCIN 62.5 MILLI-UNITS/MIN: 10 INJECTION INTRAVENOUS at 05:15

## 2024-10-01 RX ADMIN — EPHEDRINE SULFATE 10 MG: 50 INJECTION INTRAVENOUS at 04:50

## 2024-10-01 RX ADMIN — FENTANYL CITRATE 50 MCG: 50 INJECTION INTRAMUSCULAR; INTRAVENOUS at 05:42

## 2024-10-01 RX ADMIN — HYDROMORPHONE HYDROCHLORIDE 0.5 MG: 1 INJECTION, SOLUTION INTRAMUSCULAR; INTRAVENOUS; SUBCUTANEOUS at 13:01

## 2024-10-01 RX ADMIN — HYDROMORPHONE HYDROCHLORIDE 0.5 MG: 1 INJECTION, SOLUTION INTRAMUSCULAR; INTRAVENOUS; SUBCUTANEOUS at 10:15

## 2024-10-01 RX ADMIN — POTASSIUM CHLORIDE: 14.9 INJECTION, SOLUTION INTRAVENOUS at 08:08

## 2024-10-01 RX ADMIN — CEFAZOLIN 2000 MG: 1 INJECTION, POWDER, FOR SOLUTION INTRAMUSCULAR; INTRAVENOUS at 03:05

## 2024-10-01 RX ADMIN — KETOROLAC TROMETHAMINE 30 MG: 30 INJECTION, SOLUTION INTRAMUSCULAR; INTRAVENOUS at 04:08

## 2024-10-01 RX ADMIN — SODIUM BICARBONATE 50 MEQ: 84 INJECTION, SOLUTION INTRAVENOUS at 08:24

## 2024-10-01 RX ADMIN — CALCIUM CHLORIDE 0.5 G: 100 INJECTION INTRAVENOUS; INTRAVENTRICULAR at 08:19

## 2024-10-01 RX ADMIN — SODIUM CHLORIDE: 0.9 INJECTION, SOLUTION INTRAVENOUS at 10:00

## 2024-10-01 RX ADMIN — BUPIVACAINE HYDROCHLORIDE IN DEXTROSE 1.6 ML: 7.5 INJECTION, SOLUTION SUBARACHNOID at 03:00

## 2024-10-01 RX ADMIN — PHENYLEPHRINE HYDROCHLORIDE 50 MCG/MIN: 50 INJECTION INTRAVENOUS at 02:58

## 2024-10-01 RX ADMIN — TRANEXAMIC ACID 1000 MG: 10 INJECTION, SOLUTION INTRAVENOUS at 07:56

## 2024-10-01 RX ADMIN — MORPHINE SULFATE 0.15 MG: 0.5 INJECTION, SOLUTION EPIDURAL; INTRATHECAL; INTRAVENOUS at 03:00

## 2024-10-01 RX ADMIN — SUGAMMADEX 200 MG: 100 INJECTION, SOLUTION INTRAVENOUS at 11:16

## 2024-10-01 RX ADMIN — SODIUM CHLORIDE, SODIUM LACTATE, POTASSIUM CHLORIDE, CALCIUM CHLORIDE: 600; 310; 30; 20 INJECTION, SOLUTION INTRAVENOUS at 03:50

## 2024-10-01 RX ADMIN — SODIUM CHLORIDE, SODIUM LACTATE, POTASSIUM CHLORIDE, AND CALCIUM CHLORIDE 125 ML/HR: .6; .31; .03; .02 INJECTION, SOLUTION INTRAVENOUS at 05:15

## 2024-10-01 RX ADMIN — SODIUM CHLORIDE: 0.9 INJECTION, SOLUTION INTRAVENOUS at 07:28

## 2024-10-01 RX ADMIN — ROCURONIUM BROMIDE 50 MG: 10 INJECTION INTRAVENOUS at 07:42

## 2024-10-01 RX ADMIN — FENTANYL CITRATE 25 MCG: 50 INJECTION INTRAMUSCULAR; INTRAVENOUS at 11:23

## 2024-10-01 RX ADMIN — DEXAMETHASONE SODIUM PHOSPHATE 10 MG: 10 INJECTION, SOLUTION INTRAMUSCULAR; INTRAVENOUS at 03:04

## 2024-10-01 RX ADMIN — FENTANYL CITRATE 50 MCG: 50 INJECTION INTRAMUSCULAR; INTRAVENOUS at 06:38

## 2024-10-01 RX ADMIN — SODIUM CHLORIDE, SODIUM LACTATE, POTASSIUM CHLORIDE, AND CALCIUM CHLORIDE: .6; .31; .03; .02 INJECTION, SOLUTION INTRAVENOUS at 07:28

## 2024-10-01 RX ADMIN — CARBOPROST TROMETHAMINE 250 MCG: 250 INJECTION INTRAMUSCULAR at 08:57

## 2024-10-01 RX ADMIN — MAGNESIUM SULFATE HEPTAHYDRATE 2 G: 40 INJECTION, SOLUTION INTRAVENOUS at 08:34

## 2024-10-01 RX ADMIN — SODIUM CHLORIDE, SODIUM LACTATE, POTASSIUM CHLORIDE, AND CALCIUM CHLORIDE: .6; .31; .03; .02 INJECTION, SOLUTION INTRAVENOUS at 09:12

## 2024-10-01 RX ADMIN — EPHEDRINE SULFATE 10 MG: 50 INJECTION INTRAVENOUS at 04:36

## 2024-10-01 RX ADMIN — ONDANSETRON 4 MG: 2 INJECTION INTRAMUSCULAR; INTRAVENOUS at 02:54

## 2024-10-01 RX ADMIN — FENTANYL CITRATE 50 MCG: 50 INJECTION INTRAMUSCULAR; INTRAVENOUS at 08:42

## 2024-10-01 RX ADMIN — METHYLERGONOVINE MALEATE 0.2 MG: 0.2 INJECTION INTRAVENOUS at 08:39

## 2024-10-01 RX ADMIN — Medication 250 MILLI-UNITS/MIN: at 07:31

## 2024-10-01 RX ADMIN — TRANEXAMIC ACID 1000 MG: 10 INJECTION, SOLUTION INTRAVENOUS at 10:50

## 2024-10-01 RX ADMIN — PROPOFOL 150 MG: 10 INJECTION, EMULSION INTRAVENOUS at 07:35

## 2024-10-01 RX ADMIN — CALCIUM CHLORIDE 0.5 G: 100 INJECTION INTRAVENOUS; INTRAVENTRICULAR at 08:24

## 2024-10-01 RX ADMIN — EPHEDRINE SULFATE 10 MG: 50 INJECTION INTRAVENOUS at 04:54

## 2024-10-01 RX ADMIN — FENTANYL CITRATE 25 MCG: 50 INJECTION INTRAMUSCULAR; INTRAVENOUS at 11:27

## 2024-10-01 RX ADMIN — AMPICILLIN SODIUM 2000 MG: 2 INJECTION, POWDER, FOR SOLUTION INTRAMUSCULAR; INTRAVENOUS at 02:32

## 2024-10-01 RX ADMIN — MIDAZOLAM 2 MG: 1 INJECTION INTRAMUSCULAR; INTRAVENOUS at 07:44

## 2024-10-01 RX ADMIN — HYDROMORPHONE HYDROCHLORIDE 0.5 MG: 1 INJECTION, SOLUTION INTRAMUSCULAR; INTRAVENOUS; SUBCUTANEOUS at 12:26

## 2024-10-01 RX ADMIN — SODIUM CHLORIDE, SODIUM LACTATE, POTASSIUM CHLORIDE, AND CALCIUM CHLORIDE: .6; .31; .03; .02 INJECTION, SOLUTION INTRAVENOUS at 02:52

## 2024-10-01 RX ADMIN — SODIUM CHLORIDE, SODIUM LACTATE, POTASSIUM CHLORIDE, AND CALCIUM CHLORIDE: .6; .31; .03; .02 INJECTION, SOLUTION INTRAVENOUS at 08:13

## 2024-10-01 RX ADMIN — CEFAZOLIN 2000 MG: 1 INJECTION, POWDER, FOR SOLUTION INTRAMUSCULAR; INTRAVENOUS at 07:37

## 2024-10-01 RX ADMIN — FENTANYL CITRATE 50 MCG: 50 INJECTION INTRAMUSCULAR; INTRAVENOUS at 07:57

## 2024-10-01 RX ADMIN — EPHEDRINE SULFATE 50 MG: 50 INJECTION INTRAVENOUS at 04:37

## 2024-10-01 RX ADMIN — ALBUMIN (HUMAN): 12.5 INJECTION, SOLUTION INTRAVENOUS at 04:55

## 2024-10-01 RX ADMIN — Medication 250 MILLI-UNITS/MIN: at 03:16

## 2024-10-01 RX ADMIN — LIDOCAINE HYDROCHLORIDE 100 MG: 10 INJECTION, SOLUTION EPIDURAL; INFILTRATION; INTRACAUDAL at 07:35

## 2024-10-01 RX ADMIN — FENTANYL CITRATE 15 MCG: 50 INJECTION INTRAMUSCULAR; INTRAVENOUS at 03:00

## 2024-10-01 RX ADMIN — Medication: at 20:42

## 2024-10-01 NOTE — ED PROVIDER NOTES
Final Diagnosis:  1. Pregnancy    2. Hand presentation    3. Hemorrhage        No chief complaint on file.      HPI  Pt arrives w/  by private vehicle     at 40 weeks by LMP. No prenatal care. Been on only prenatal vit.     Pt tells me traumatic experience w/ ob at [facility] so didn't want prenatal care. During that, needed full term induction best I can discern w/ prolonged labor. Of note - this part is translated through     Otherwise   15 min PTA water broke   Was feeling regular contractions until 8 min ago then dec   No bleeding   Here recurrence of contractions    FHT fluctuate bet 130 and 180 here.   Sterile vaginal exam station is -2. Feel cord presenting. Feels like some about 5cm dilation. No idea effacement.     Transfer order placed, other provider sterile vaginal exam confirm cord. Starts lifting relieve weight on cord.  Priority one      Pt hx - no med probs, prior appendectomy.     EMS additionally reports:     - Previous charting underwent limited review with attention to last ED visits, labs, ekgs, and prior imaging.  Chart review reveals :     Admission on 10/01/2024, Discharged on 10/01/2024   Component Date Value Ref Range Status    WBC 10/01/2024 9.22  4.31 - 10.16 Thousand/uL Final    RBC 10/01/2024 4.01  3.81 - 5.12 Million/uL Final    Hemoglobin 10/01/2024 9.5 (L)  11.5 - 15.4 g/dL Final    Hematocrit 10/01/2024 30.5 (L)  34.8 - 46.1 % Final    MCV 10/01/2024 76 (L)  82 - 98 fL Final    MCH 10/01/2024 23.7 (L)  26.8 - 34.3 pg Final    MCHC 10/01/2024 31.1 (L)  31.4 - 37.4 g/dL Final    RDW 10/01/2024 18.2 (H)  11.6 - 15.1 % Final    MPV 10/01/2024 12.0  8.9 - 12.7 fL Final    Platelets 10/01/2024 187  149 - 390 Thousands/uL Final    nRBC 10/01/2024 0  /100 WBCs Final    Segmented % 10/01/2024 55  43 - 75 % Final    Immature Grans % 10/01/2024 1  0 - 2 % Final    Lymphocytes % 10/01/2024 30  14 - 44 % Final    Monocytes % 10/01/2024 10  4 - 12 % Final    Eosinophils Relative  10/01/2024 3  0 - 6 % Final    Basophils Relative 10/01/2024 1  0 - 1 % Final    Absolute Neutrophils 10/01/2024 5.16  1.85 - 7.62 Thousands/µL Final    Absolute Immature Grans 10/01/2024 0.10  0.00 - 0.20 Thousand/uL Final    Absolute Lymphocytes 10/01/2024 2.73  0.60 - 4.47 Thousands/µL Final    Absolute Monocytes 10/01/2024 0.89  0.17 - 1.22 Thousand/µL Final    Eosinophils Absolute 10/01/2024 0.27  0.00 - 0.61 Thousand/µL Final    Basophils Absolute 10/01/2024 0.07  0.00 - 0.10 Thousands/µL Final    Sodium 10/01/2024 134 (L)  135 - 147 mmol/L Final    Potassium 10/01/2024 3.7  3.5 - 5.3 mmol/L Final    Chloride 10/01/2024 105  96 - 108 mmol/L Final    CO2 10/01/2024 17 (L)  21 - 32 mmol/L Final    ANION GAP 10/01/2024 12  4 - 13 mmol/L Final    BUN 10/01/2024 10  5 - 25 mg/dL Final    Creatinine 10/01/2024 0.59 (L)  0.60 - 1.30 mg/dL Final    Standardized to IDMS reference method    Glucose 10/01/2024 88  65 - 140 mg/dL Final    If the patient is fasting, the ADA then defines impaired fasting glucose as > 100 mg/dL and diabetes as > or equal to 123 mg/dL.    Calcium 10/01/2024 8.6  8.4 - 10.2 mg/dL Final    Corrected Calcium 10/01/2024 9.2  8.3 - 10.1 mg/dL Final    AST 10/01/2024 15  13 - 39 U/L Final    ALT 10/01/2024 8  7 - 52 U/L Final    Specimen collection should occur prior to Sulfasalazine administration due to the potential for falsely depressed results.     Alkaline Phosphatase 10/01/2024 188 (H)  34 - 104 U/L Final    Total Protein 10/01/2024 6.7  6.4 - 8.4 g/dL Final    Albumin 10/01/2024 3.3 (L)  3.5 - 5.0 g/dL Final    Total Bilirubin 10/01/2024 0.43  0.20 - 1.00 mg/dL Final    Use of this assay is not recommended for patients undergoing treatment with eltrombopag due to the potential for falsely elevated results.  N-acetyl-p-benzoquinone imine (metabolite of Acetaminophen) will generate erroneously low results in samples for patients that have taken an overdose of Acetaminophen.    eGFR 10/01/2024  119  ml/min/1.73sq m Final    Magnesium 10/01/2024 1.5 (L)  1.9 - 2.7 mg/dL Final       - Vincentian  language barrier.   - History obtained from patient via combination of Vincentian by provider, cyracom and husabnd as .   - Discuss patient's care, with patient permission or by chart review, with  .     PMH:   has no past medical history on file.    PSH:   has no past surgical history on file.     Social History:  Tobacco Use: Low Risk  (10/1/2024)    Patient History     Smoking Tobacco Use: Never     Smokeless Tobacco Use: Never     Passive Exposure: Not on file     Alcohol Use: Not on file     No illicit use       ROS:  Pertinent positives/negatives: .     Some ROS may be present in the HPI and would take precedent over these standard questions asked below.   Review of Systems   Gastrointestinal:  Positive for abdominal distention and abdominal pain.   Genitourinary:  Positive for pelvic pain and vaginal discharge.        CONSTITUTIONAL:  No lethargy. No unexpected weight loss. No change in behavior.  EYES:  No pain, redness, or discharge. No loss of vision. No orbital trauma or pain.   ENT:  No tinnitus or decreased hearing. No epistaxis/purulent rhinorrhea. No voice change, airway closing, trismus.   CARDIOVASCULAR:  No chest pain. No skin mottling or pallor. No change in exertional capacity  RESPIRATORY:  No hemoptysis. No paroxysmal nocturnal dyspnea. No stridor. No apnea or bluing.   GASTROINTESTINAL:  No vomiting, diarrhea. No distension. No melena. No hematochezia.   GENITOURINARY:  No nocturia. No hematuria or foul smelling or cloudy urine. No discharge. No sores/adenopathy.   MUSCULOSKELETAL:  No contracture.  No new deformity.   INTEGUMENTARY:  No swelling. No unexpected contusions. No abrasions. No lymphangitis.  NEUROLOGIC:  No meningismus. No new numbness of the extremities. No new focal weakness. No postural instability  PSYCHIATRIC:  No SI HI AVH  HEMATOLOGICAL:  No bleeding. No  petechiae. No bruising.  ALLERGIES:  No urticaria. No sudden abd cramping. No stridor.    PE:     Physical exam highlights:   Physical Exam       Vitals:    10/01/24 0715 10/01/24 0716 10/01/24 0720 10/01/24 0731   BP:   100/58 96/59   Pulse: (!) 135 (!) 136  (!) 123   Resp:    21   Temp:    (!) 96.6 °F (35.9 °C)   TempSrc:    Temporal   SpO2: 93% 96%  96%     Vitals reviewed by me.   Nursing note reviewed  Chaperone present for all sensitive exam.  See HPI for sterile vaginal exam.   Const: No acute distress. Alert. Nontoxic. Not diaphoretic.    HEENT: External ears normal. No protrusion drainage swelling. Nose normal. No drainage/traumatic deformity. MM. Mouth with baseline/symmetric movement. No trismus.   Eyes: No squinting. No icterus. No tearing/swelling/drainage. Tracks through the room with normal EOM.   Neck: ROM normal. No rigidity. No meningismus.  Cards: Rate as per vitals Compared to monitor sinus unless documented. Regular Well perfused.  Pulm: Effort and excursion normal. No distress. No audible wheezing/no stridor. Normal resp rate without retraction or change in work of breathing.  Abd: pregnant, very. No fluctuant wave. Patient without peritoneal pain with shifting/bumping the bed.   MSK: ROM normal baseline. No deformity. No contractures from baseline.   Skin: No new rashes visible. Well perfused. No wounds visualized on exposed skin  Neuro: Nonfocal. Baseline. CN grossly intact. Moving all four with coordination.   Psych: Normal behavior and affect.        A:  - Nursing note reviewed.    Ddx and MDM  Considered diagnoses    While awaiting transport, in conj w/ ob at [place], administer ampicillin and azithro group b prophy  Ivs established  Continuous upwards pressure off cord    BLS first available.   I will ride with them so resume lift. On re exam, I feel two hands, station about 0, fully dilated. Occasionally feel pulsation hard to tell if cord.     OB asks for continuous doppler, which I  perform en route w/ variation between 130 and 210. Mostly around 150.     En route station to +2, two hands grabbing me and arms. Occasional pulsation. Good FHT whole time.           Decision rules:                           My read of the XR/CT scan reveals:       Orders Placed This Encounter   Procedures    Chlamydia/GC amplified DNA by PCR    CTA abdomen pelvis w wo contrast    L&D GREEN / YELLOW ON HOLD    CBC and Platelet    RPR-Syphilis Screening (Total Syphilis IGG/IGM)    CORD, Blood gas, venous    CORD, Blood gas, arterial    HIV 1/2 AB/AG w Reflex [facility] for 2 yr old and above    Hepatitis C antibody    Hepatitis B surface antibody    Rubella antibody, IgG    Hepatitis B surface antigen    CBC    Drug Screen Routine w /Conf and Adulteration, urine.    CBC and Platelet    Thrombin Time Mixing Study    Platelet count    Protime-INR    APTT    Thrombin time    Fibrinogen    Diet Regular; Regular House    Insert peripheral IV    Insert urinary catheter    SCD (sequential compression devices)    Vital signs (per unit protocol)    Continuous Pulse Ox    Nursing communication May Discharge From PACU    Fingerstick glucose for all known diabetic patients and/or for all patients who had blood glucose greater than 120 preoperatively    No narcotics / sedatives / sleeping agents not ordered by anesthesia    Vital signs (per unit protocol)    Pulse Oximetry,Spot    Notify physician    Up as tolerated    Patient may shower    Discontinue urinary catheter Post-op Day #1    Abdominal binder    Turn, cough and deep breathe    Incentive spirometry    Place sequential compression device    Vital Signs    Notify physician and Hold transfusion if:    Vital Signs    Notify physician and Hold transfusion if:    Level 1-Full Code: all life saving measures are indicated    Inpatient consult to Anesthesiology    Type and screen    Prepare Leukoreduced RBC: 2 Units    Prepare Leukoreduced RBC: 3 Units    Prepare Plasma: 3 Units     Prepare Leukoreduced Platelet Pheresis (1 pheresis product = 6-8 pooled units): 1 Product    Prepare Leukoreduced RBC: 4 Units    Prepare Plasma: 2 Units    Prepare Leukoreduced Platelet Pheresis (1 pheresis product = 6-8 pooled units): 1 Product    Prepare Leukoreduced RBC: 4 Units    Prepare Leukoreduced Platelet Pheresis (1 pheresis product = 6-8 pooled units): 1 Product    Inpatient Admission    Transfer patient     Labs Reviewed   CBC AND PLATELET - Abnormal       Result Value Ref Range Status    WBC 12.40 (*) 4.31 - 10.16 Thousand/uL Final    RBC 3.13 (*) 3.81 - 5.12 Million/uL Final    Hemoglobin 7.4 (*) 11.5 - 15.4 g/dL Final    Hematocrit 24.2 (*) 34.8 - 46.1 % Final    MCV 77 (*) 82 - 98 fL Final    MCH 23.6 (*) 26.8 - 34.3 pg Final    MCHC 30.6 (*) 31.4 - 37.4 g/dL Final    RDW 18.2 (*) 11.6 - 15.1 % Final    Platelets 173  149 - 390 Thousands/uL Final    MPV 12.9 (*) 8.9 - 12.7 fL Final   BLOOD GAS, VENOUS, CORD - Abnormal    pH, Cord Yohan 7.192  7.190 - 7.490 Final    pCO2, Cord Yohan 56.3 (*) 27.0 - 43.0 mm HG Final    pO2, Cord Yohan 16.4  15.0 - 45.0 mm HG Final    HCO3, Cord Yohan 21.1  12.2 - 28.6 mmol/L Final    Base Exc, Cord Yohan -7.8 (*) 1.0 - 9.0 mmol/L Final    O2 Cont, Cord Yohan 5.8  mL/dL Final    O2 HGB,VENOUS CORD 26.3  % Final   BLOOD GAS, ARTERIAL, CORD - Abnormal    pH, Cord Art 7.354  7.230 - 7.430 Final    pCO2, Cord Art 38.4  30.0 - 60.0 Final    pO2, Cord Art 25.0  5.0 - 25.0 mm HG Final    HCO3, Cord Art 20.9  17.3 - 27.3 mmol/L Final    Base Exc, Cord Art -4.1 (*) 3.0 - 11.0 mmol/L Final    O2 Content, Cord Art 12.3  ml/dl Final    O2 Hgb, Arterial Cord 56.4  % Final   CBC AND PLATELET - Abnormal    WBC 16.49 (*) 4.31 - 10.16 Thousand/uL Final    RBC 2.88 (*) 3.81 - 5.12 Million/uL Final    Hemoglobin 7.0 (*) 11.5 - 15.4 g/dL Final    Hematocrit 22.7 (*) 34.8 - 46.1 % Final    MCV 79 (*) 82 - 98 fL Final    MCH 24.3 (*) 26.8 - 34.3 pg Final    MCHC 30.8 (*) 31.4 - 37.4 g/dL Final    RDW  16.9 (*) 11.6 - 15.1 % Final    Platelets 158  149 - 390 Thousands/uL Final    MPV 12.1  8.9 - 12.7 fL Final   PLATELET COUNT - Normal    Platelets 158  149 - 390 Thousands/uL Final    MPV 12.1  8.9 - 12.7 fL Final   PROTIME-INR - Normal    Protime 14.8  12.3 - 15.0 seconds Final    INR 1.08  0.85 - 1.19 Final    Narrative:     INR Therapeutic Range    Indication                                             INR Range      Atrial Fibrillation                                               2.0-3.0  Hypercoagulable State                                    2.0.2.3  Left Ventricular Asist Device                            2.0-3.0  Mechanical Heart Valve                                  -    Aortic(with afib, MI, embolism, HF, LA enlargement,    and/or coagulopathy)                                     2.0-3.0 (2.5-3.5)     Mitral                                                             2.5-3.5  Prosthetic/Bioprosthetic Heart Valve               2.0-3.0  Venous thromboembolism (VTE: VT, PE        2.0-3.0   APTT - Normal    PTT 26  23 - 34 seconds Final    Comment: Therapeutic Heparin Range =  60-90 seconds   FIBRINOGEN - Normal    Fibrinogen 284  206 - 523 mg/dL Final   CHLAMYDIA /GC AMPLIFIED DNA   L&D GREEN / YELLOW ON HOLD    Extra Tube Hold recieved   Final   RPR-SYPHILIS SCREENING (TOTAL SYPHILIS IGG/IGM)   HIV 1/2 AG/AB W REFLEX [facility] FOR 2 YR OLD AND ABOVE   HEPATITIS C ANTIBODY   HEPATITIS B SURFACE ANTIBODY QUANT   RUBELLA ANTIBODY, IGG   HEPATITIS B SURFACE ANTIGEN   DRUG SCREEN ROUTINE W /CONF AND ADULTERATION, URINE   EQUAL MIX,THROMBIN TIME   THROMBIN TIME   TYPE AND SCREEN    ABO Grouping O   Final    Rh Factor Positive   Final    Antibody Screen Negative   Final    Specimen Expiration Date 20241004   Final   PREPARE LEUKOREDUCED RBC    Unit Product Code S5388P80       Unit Number [id number]       Unit ABO O       Unit RH POS       Crossmatch Compatible   Final    Unit Dispense Status Issued       Unit  Product Volume 350  ml     Unit Product Code M4226E40       Unit Number [id number]       Unit ABO O       Unit RH POS       Crossmatch Compatible   Final    Unit Dispense Status Issued       Unit Product Volume 300  ml    PREPARE LEUKOREDUCED RBC    Unit Product Code B7143B70       Unit Number [id number]       Unit ABO O       Unit RH POS       Crossmatch Compatible   Final    Unit Dispense Status Issued       Unit Product Volume 350  ml     Unit Product Code T7459L99       Unit Number [id number]       Unit ABO O       Unit RH POS       Crossmatch Compatible   Final    Unit Dispense Status Issued       Unit Product Volume 300  ml     Unit Product Code P5472W62       Unit Number [id number]       Unit ABO O       Unit RH POS       Crossmatch Compatible   Final    Unit Dispense Status Issued       Unit Product Volume 350  ml    PREPARE PLASMA ADULT    Unit Product Code H2201Q97       Unit Number [id number]       Unit ABO A       Unit RH POS       Unit Dispense Status Issued       Unit Product Volume 250  ml     Unit Product Code C7261H94       Unit Number [id number]       Unit ABO A       Unit RH POS       Unit Dispense Status Issued       Unit Product Volume 280  ml     Unit Product Code P6419J99       Unit Number [id number]       Unit ABO A       Unit RH POS       Unit Dispense Status Issued       Unit Product Volume 280  ml    PREPARE LEUKOREDUCED PLATELET PHERESIS    Unit Product Code E8731A83       Unit Number [id number]       Unit ABO B       Unit RH NEG       Unit Dispense Status Issued       Unit Product Volume 300  mL    PREPARE LEUKOREDUCED RBC    Unit Product Code Y8351W66       Unit Number [id number]       Unit ABO O       Unit RH POS       Crossmatch Compatible   Final    Unit Dispense Status Issued       Unit Product Volume 350  ml     Unit Product Code Z3955W02       Unit Number [id number]       Unit ABO O       Unit RH POS       Crossmatch Compatible    Final    Unit Dispense Status Issued       Unit Product Volume 300  ml     Unit Product Code F8368X95       Unit Number [id number]       Unit ABO O       Unit RH POS       Crossmatch Compatible   Final    Unit Dispense Status Issued       Unit Product Volume 300  ml     Unit Product Code U8922J83       Unit Number [id number]       Unit ABO O       Unit RH POS       Crossmatch Compatible   Final    Unit Dispense Status Issued       Unit Product Volume 300  ml    PREPARE PLASMA ADULT    Unit Product Code N5319D89       Unit Number [id number]       Unit ABO A       Unit RH POS       Unit Dispense Status Issued       Unit Product Volume 280  ml     Unit Product Code E0552C07       Unit Number [id number]       Unit ABO AB       Unit RH POS       Unit Dispense Status Issued       Unit Product Volume 250  ml    PREPARE LEUKOREDUCED PLATELET PHERESIS    Unit Product Code Y4609X69       Unit Number [id number]       Unit ABO A       Unit RH POS       Unit Dispense Status Crossmatched       Unit Product Volume 300  mL    PREPARE LEUKOREDUCED RBC    Unit Product Code J0651O67   Final    Unit Number [id number]   Final    Unit ABO O   Final    Unit RH POS   Final    Crossmatch Compatible   Final    Unit Dispense Status Crossmatched   Final    Unit Product Volume 350  ml     Unit Product Code S5655T77   Final    Unit Number [id number]   Final    Unit ABO O   Final    Unit RH POS   Final    Crossmatch Compatible   Final    Unit Dispense Status Crossmatched   Final    Unit Product Volume 350  ml     Unit Product Code L5920K38   Final    Unit Number [id number]   Final    Unit ABO O   Final    Unit RH POS   Final    Crossmatch Compatible   Final    Unit Dispense Status Crossmatched   Final    Unit Product Volume 350  ml     Unit Product Code S1522Z93   Final    Unit Number [id number]   Final    Unit ABO O   Final    Unit RH POS   Final    Crossmatch Compatible   Final    Unit Dispense  Status Crossmatched   Final    Unit Product Volume 350  ml    PREPARE LEUKOREDUCED PLATELET PHERESIS   TISSUE EXAM OB ([facility]) ONLY   COAGULATION PROFILE    Narrative:     The following orders were created for panel order Coagulation Profile.  Procedure                               Abnormality         Status                     ---------                               -----------         ------                     Equal mix, APTT[[id number]]                                                             Equal mix, PT / INR[[id number]]                                                         Thrombin Time Mixing Study[[id number]]                       In process                 Platelet count[[id number]]               Normal              Final result               Protime-INR[[id number]]                  Normal              Final result               APTT[[id number]]                         Normal              Final result               Thrombin time[[id number]]                                    In process                 Fibrinogen[[id number]]                   Normal              Final result                 Please view results for these tests on the individual orders.       *Each of these labs was reviewed. Particular standout labs will be noted in the ED Course above     Final Diagnosis:  1. Pregnancy    2. Hand presentation    3. Hemorrhage          P:  - hospital tx includes   Medications   ceFAZolin (ANCEF) IVPB (premix in dextrose) 2,000 mg 50 mL (has no administration in time range)   ondansetron (ZOFRAN) injection 4 mg (has no administration in time range)   naloxone (NARCAN) injection 0.1 mg ( Intravenous MAR Hold 10/1/24 0727)   acetaminophen (TYLENOL) tablet 650 mg ( Oral Dose Auto Held 10/2/24 0000)   ketorolac (TORADOL) injection 30 mg ( Intravenous Dose Auto Held 10/1/24 2200)   oxyCODONE (ROXICODONE) IR tablet 5 mg ( Oral MAR Hold 10/1/24 0727)   oxyCODONE (ROXICODONE) immediate release tablet 10 mg ( Oral  MAR Hold 10/1/24 0727)   HYDROmorphone (DILAUDID) injection 0.5 mg ( Intravenous MAR Hold 10/1/24 0727)   lactated ringers infusion (125 mL/hr Intravenous New Bag 10/1/24 0515)   acetaminophen (TYLENOL) tablet 650 mg (has no administration in time range)   oxyCODONE (ROXICODONE) IR tablet 5 mg (has no administration in time range)   oxyCODONE (ROXICODONE) immediate release tablet 10 mg (has no administration in time range)   diphenhydrAMINE (BENADRYL) tablet 25 mg (has no administration in time range)   docusate sodium (COLACE) capsule 100 mg (has no administration in time range)   ondansetron (ZOFRAN) injection 4 mg (has no administration in time range)   calcium carbonate (TUMS) chewable tablet 1,000 mg (has no administration in time range)   simethicone (MYLICON) chewable tablet 80 mg (has no administration in time range)   enoxaparin (LOVENOX) subcutaneous injection 40 mg (has no administration in time range)   ibuprofen (MOTRIN) tablet 600 mg ( Oral Dose Auto Held 10/5/24 1800)   cephalexin (KEFLEX) capsule 500 mg ( Oral Dose Auto Held 10/2/24 2100)   metroNIDAZOLE (FLAGYL) tablet 500 mg ( Oral Dose Auto Held 10/2/24 2100)   sodium chloride 0.9 % infusion (1,000 mL Irrigation Given 10/1/24 0758)   sterile water irrigation solution (1,000 mL Irrigation Given 10/1/24 0803)   fentaNYL (SUBLIMAZE) injection 50 mcg (50 mcg Intravenous Given 10/1/24 0638)   oxytocin (PITOCIN) 30 Units in lactated ringers 500 mL infusion (62.5 jazmin-units/min Intravenous New Bag 10/1/24 0515)   iohexol (OMNIPAQUE) 350 MG/ML injection (MULTI-DOSE) 100 mL (100 mL Intravenous Given 10/1/24 0625)         - disposition  Time reflects when diagnosis was documented in both MDM as applicable and the Disposition within this note       Time User Action Codes Description Comment    10/1/2024  2:53 AM Meaghan Abraham Add [Z34.90] Pregnancy     10/1/2024  3:16 AM Loretta Gallegos Add [O32.2XX0] Hand presentation     10/1/2024  7:04 AM Judy Montes  "[R58] Hemorrhage           ED Disposition       None          Follow-up Information    None         - patient will call their PCP to let them know they were in the emergency department. We discuss return precautions and patient is agreeable with plan and aformentioned disposition.       - additional treatment intended, if consistent with primary provider:  - patient to follow with :      There are no discharge medications for this patient.    No discharge procedures on file.  None       Portions of the record may have been created with voice recognition software. Occasional wrong word or \"sound a like\" substitutions may have occurred due to the inherent limitations of voice recognition software. Read the chart carefully and recognize, using context, where substitutions have occurred.    Electronically signed by:  MD Say Velázquez MD  10/01/24 0841    "

## 2024-10-01 NOTE — ANESTHESIA POSTPROCEDURE EVALUATION
Post-Op Assessment Note    CV Status:  Stable  Pain Score: 0    Pain management: adequate       Mental Status:  Alert and awake   Hydration Status:  Euvolemic   PONV Controlled:  Controlled   Airway Patency:  Patent     Post Op Vitals Reviewed: Yes    No anethesia notable event occurred.    Staff: CRNA               BP   103/67   Temp   98.2   Pulse  107   Resp   16   SpO2   100%

## 2024-10-01 NOTE — ASSESSMENT & PLAN NOTE
2/2 emergent , retroperitoneal and broad ligament hematoma. Now status post exploratory laparotomy, supracervical  hysterectomy, ligation of the left internal iliac artery and cystoscopy.  She is status post 6U PRBC, 3U FFP, 1 pack platelet

## 2024-10-01 NOTE — ASSESSMENT & PLAN NOTE
34-year-old G2, P2 postop day 0 from  delivery followed by return to the OR for retroperitoneal and broad ligament hematoma and hemodynamic instability now status post exploratory laparotomy, supracervical  hysterectomy, ligation of the left internal iliac artery and cystoscopy.  She is status post 6U PRBC, 3U FFP, 1 pack platelet  HD stable  Continue to trend labs and transfuse as needed.  Continue close monitoring of I/Os

## 2024-10-01 NOTE — ANESTHESIA POSTPROCEDURE EVALUATION
Post-Op Assessment Note    CV Status:  Stable  Pain Score: 0    Pain management: adequate       Mental Status:  Alert and awake   Hydration Status:  Euvolemic   PONV Controlled:  Controlled   Airway Patency:  Patent     Post Op Vitals Reviewed: Yes    No anethesia notable event occurred.    Staff: CRNA               BP   90/55   Temp      Pulse  92   Resp   18   SpO2   98

## 2024-10-01 NOTE — ANESTHESIA PROCEDURE NOTES
Anesthesia Notable Event    Date/Time: 10/1/2024 6:50 AM    Patient location during procedure: PACU  Performed by: Robbi Diane MD  Authorized by: Robbi Diane MD    Anesthesia notable event Other: other  Post-operative bleeding, was not notified of unstable blood pressure and transfusions until 0649, at that point patient was hypotensive and tachycardic and blood had been paused, then continued at a much slower rate due to a suspected transfusion reaction. Emergently taken for an exploratory laparotomy and subsequent hysterectomy.

## 2024-10-01 NOTE — ANESTHESIA PROCEDURE NOTES
"Arterial Line Insertion    Performed by: Effie Dennis CRNA  Authorized by: Effie Dennis CRNA  Consent: The procedure was performed in an emergent situation.  Patient understanding: patient states understanding of the procedure being performed  Patient consent: the patient's understanding of the procedure matches consent given  Procedure consent: procedure consent matches procedure scheduled  Relevant documents: relevant documents present and verified  Required items: required blood products, implants, devices, and special equipment available  Patient identity confirmed: verbally with patient  Time out: Immediately prior to procedure a \"time out\" was called to verify the correct patient, procedure, equipment, support staff and site/side marked as required.  Preparation: Patient was prepped and draped in the usual sterile fashion.  Indications: multiple ABGs and hemodynamic monitoring  Orientation:  Right  Location: radial artery  Sedation:  Patient sedated: geta.    Procedure Details:  Needle gauge: 20  Seldinger technique: Seldinger technique used  Number of attempts: 1    Post-procedure:  Post-procedure: dressing applied  Waveform: good waveform  Patient tolerance: Patient tolerated the procedure well with no immediate complications          "

## 2024-10-01 NOTE — ADDENDUM NOTE
Addendum  created 10/01/24 1823 by Robbi Diane MD    Child order released for a procedure order, Clinical Note Signed, Intraprocedure Blocks edited, SmartForm saved

## 2024-10-01 NOTE — PROGRESS NOTES
Progress Note - OB/GYN   Name: Buffy Pope 34 y.o. female I MRN: [id number]  Unit/Bed#: LD PACU-01 I Date of Admission: 10/1/2024   Date of Service: 10/1/2024 I Hospital Day: 0     Assessment & Plan  Pregnant    No prenatal care in current pregnancy      Patient returned from CT- experiencing more pain. HR noted to be tachy to 130s.   1u pRBCs transfusing. Will treat pain and continue to monitor. Pending official read.

## 2024-10-01 NOTE — PROGRESS NOTES
Progress Note - OB/GYN   Name: Buffy Pope 34 y.o. female I MRN: [id number]  Unit/Bed#: ICU 14 I Date of Admission: 10/1/2024   Date of Service: 10/1/2024 I Hospital Day: 0     Assessment & Plan  Status post abdominal supracervical hysterectomy  34-year-old G2, P2 postop day 0 from  delivery followed by return to the OR for retroperitoneal and broad ligament hematoma and hemodynamic instability now status post exploratory laparotomy, supracervical  hysterectomy, ligation of the left internal iliac artery and cystoscopy.  She is status post 6U PRBC, 3U FFP, 1 pack platelet  Patient now hemodynamically stable and ICU.  Appreciate excellent care from GYN oncology and ICU colleagues.  Continue to trend labs and transfuse as needed.  Continue close monitoring of I/Os  Pain management per ICU team-did review with ICU PA that we do typically offer her Dilaudid PCA following extensive surgery completed.  Reviewed recommended n.p.o. for now and plan to advance diet as tolerated.    Postpartum hemorrhage    Status post primary low transverse  section   doing well and nursery  Routine postpartum care and reviewed with nursing to offer breast pump and reviewed expression if desired while in ICU.  No prenatal care in current pregnancy      Subjective:     Patient seen and assessed at bedside in ICU this afternoon.    Patient reports abdominal tenderness but overall feeling well.  Notes continued fatigue but denies any chest pain, shortness of breath, nausea or vomiting.  Reviewed hemorrhage and need for supracervical hysterectomy.    /81   Pulse 86   Temp 97.5 °F (36.4 °C)   Resp 12   LMP 12/15/2023 (Approximate)   SpO2 98%   Breastfeeding Yes   Physical Exam  Vitals reviewed.   Constitutional:       General: She is not in acute distress.     Appearance: She is not diaphoretic.      Comments: Patient remains pale   HENT:      Head: Normocephalic and atraumatic.   Cardiovascular:       Rate and Rhythm: Tachycardia present.      Heart sounds: No murmur heard.     No friction rub. No gallop.   Pulmonary:      Effort: Pulmonary effort is normal. No respiratory distress.      Breath sounds: No wheezing, rhonchi or rales.   Abdominal:      Palpations: Abdomen is soft.      Tenderness: There is abdominal tenderness. There is no guarding or rebound.      Comments: Abdomen is soft, appropriately tender postoperatively.  Incision and dressing are clean/dry/intact.  Hypoactive bowel sounds auscultated on exam.   Genitourinary:     Comments: No vaginal bleeding noted.  Rogers in place draining dark yellow urine.  Musculoskeletal:      Right lower leg: No edema.      Left lower leg: No edema.      Comments: SCDs in place.   Skin:     General: Skin is warm and dry.   Neurological:      Mental Status: She is alert and oriented to person, place, and time.   Psychiatric:         Mood and Affect: Mood normal.         Behavior: Behavior normal.

## 2024-10-01 NOTE — CONSULTS
Consultation - Critical Care/ICU   Name: Buffy Pope 34 y.o. female I MRN: [id number]  Unit/Bed#: ICU 14 I Date of Admission: 10/1/2024   Date of Service: 10/1/2024 I Hospital Day: 0   Consults  Physician Requesting Evaluation: Andrea Hartman DO   Reason for Evaluation / Principal Problem: post partum hemorrhage      Critical Care/ICU service will follow.    Assessment & Plan  Status post abdominal supracervical hysterectomy  34-year-old G2, P2 postop day 0 from  delivery followed by return to the OR for retroperitoneal and broad ligament hematoma and hemodynamic instability now status post exploratory laparotomy, supracervical  hysterectomy, ligation of the left internal iliac artery and cystoscopy.  She is status post 6U PRBC, 3U FFP, 1 pack platelet  HD stable  Continue to trend labs and transfuse as needed.  Continue close monitoring of I/Os    Postpartum hemorrhage    Acute blood loss anemia  2/2 emergent , retroperitoneal and broad ligament hematoma. Now status post exploratory laparotomy, supracervical  hysterectomy, ligation of the left internal iliac artery and cystoscopy.  She is status post 6U PRBC, 3U FFP, 1 pack platelet  Status post primary low transverse  section  Routine postpartum care and reviewed with nursing to offer breast pump and reviewed expression if desired while in ICU.  No prenatal care in current pregnancy    Disposition: Critical care    History of Present Illness   Buffy Pope is a 34 y.o. 34-year-old G2, P2 postop day 0 from  delivery followed by return to the OR for retroperitoneal and broad ligament hematoma and hemodynamic instability now status post exploratory laparotomy, supracervical  hysterectomy, ligation of the left internal iliac artery and cystoscopy. Now s/p 6U PRBC, 3FFP, 1 platelet.    Transferred to ICU post op for HD monitoring    History obtained from chart review and the patient.  Review of  Systems: See HPI for Review of Systems    Historical Information   No past medical history on file. No past surgical history on file.   No current outpatient medications No Known Allergies   Social History     Tobacco Use    Smoking status: Never    Smokeless tobacco: Never   Vaping Use    Vaping status: Never Used   Substance Use Topics    Alcohol use: Never    Drug use: Never    No family history on file.       Objective :                   Vitals I/O      Most Recent Min/Max in 24hrs   Temp 97.5 °F (36.4 °C) Temp  Min: 96.3 °F (35.7 °C)  Max: 98.2 °F (36.8 °C)   Pulse (!) 130 Pulse  Min: 66  Max: 152   Resp 12 Resp  Min: 12  Max: 24   /72 BP  Min: 65/32  Max: 144/94   O2 Sat 96 % SpO2  Min: 92 %  Max: 100 %      Intake/Output Summary (Last 24 hours) at 10/1/2024 1728  Last data filed at 10/1/2024 1600  Gross per 24 hour   Intake 9935.65 ml   Output 3078 ml   Net 6857.65 ml       Diet NPO; Sips with meds    Invasive Monitoring           Physical Exam   Physical Exam  Vitals reviewed.   Eyes:      Pupils: Pupils are equal, round, and reactive to light.   Skin:     General: Skin is warm and dry.      Capillary Refill: Capillary refill takes less than 2 seconds.   HENT:      Head: Normocephalic.      Mouth/Throat:      Mouth: Mucous membranes are moist.   Cardiovascular:      Rate and Rhythm: Normal rate and regular rhythm.      Pulses: Normal pulses.   Abdominal: General: There is distension.     Comments: Clean intact bandage. Tender.    Constitutional:       Appearance: She is well-developed.   Pulmonary:      Effort: No respiratory distress.   Neurological:      Mental Status: She is alert.      GCS: GCS eye subscore is 3. GCS verbal subscore is 5. GCS motor subscore is 6.   Genitourinary/Anorectal:     Comments: talley         Diagnostic Studies        Lab Results: I have reviewed the following results:     Medications:  Scheduled PRN   [START ON 10/2/2024] acetaminophen, 650 mg, Q6H JOSIAH  cephalexin, 500  mg, Q12H JOSIAH  docusate sodium, 100 mg, BID  magnesium sulfate, 2 g, Once  metroNIDAZOLE, 500 mg, Q12H JOSIAH      calcium carbonate, 1,000 mg, Daily PRN  diphenhydrAMINE, 25 mg, Q6H PRN  HYDROmorphone, 0.5 mg, Q2H PRN  naloxone, 0.1 mg, Q3 min PRN  ondansetron, 4 mg, Q8H PRN  [START ON 10/2/2024] oxyCODONE, 10 mg, Q4H PRN  [START ON 10/2/2024] oxyCODONE, 5 mg, Q4H PRN  simethicone, 80 mg, 4x Daily PRN       Continuous    lactated ringers, 125 mL/hr, Last Rate: 125 mL/hr (10/01/24 0515)         Labs:   CBC    Recent Labs     10/01/24  0714 10/01/24  0749 10/01/24  0933 10/01/24  1445   WBC 16.49*  --   --  12.02*   HGB 7.0*   < > 8.2* 10.5*   HCT 22.7*   < > 24* 29.8*     158  --   --  104*   BANDSPCT  --   --   --  3    < > = values in this interval not displayed.     BMP    Recent Labs     10/01/24  0200 10/01/24  0822 10/01/24  0933 10/01/24  1445   SODIUM 134*  --   --  136   K 3.7  --   --  4.3     --   --  110*   CO2 17*   < > 15* 20*   AGAP 12  --   --  6   BUN 10  --   --  9   CREATININE 0.59*  --   --  0.51*   CALCIUM 8.6  --   --  8.4    < > = values in this interval not displayed.       Coags    Recent Labs     10/01/24  0714 10/01/24  1445   INR 1.08 1.02   PTT 26  --         Additional Electrolytes  Recent Labs     10/01/24  0201 10/01/24  0749 10/01/24  0933 10/01/24  1445   MG 1.5*  --   --  1.7*   PHOS  --   --   --  3.2   CAIONIZED  --    < > 1.16 1.20    < > = values in this interval not displayed.          Blood Gas    Recent Labs     10/01/24  1511   PHART 7.395   SCB0FHZ 32.7*   PO2ART 99.5   GVA7OSN 19.6*   BEART -4.5   SOURCE Line, Arterial     Recent Labs     10/01/24  1511   SOURCE Line, Arterial    LFTs  Recent Labs     10/01/24  0200 10/01/24  1445   ALT 8 10   AST 15 21   ALKPHOS 188* 89   ALB 3.3* 2.7*   TBILI 0.43 1.05*       Infectious  No recent results  Glucose  Recent Labs     10/01/24  0200 10/01/24  1445   GLUC 88 117

## 2024-10-01 NOTE — ANESTHESIA PROCEDURE NOTES
Spinal Block    Patient location during procedure: OR  Start time: 10/1/2024 3:00 AM  Reason for block: procedure for pain and at surgeon's request  Staffing  Performed by: Robbi Diane MD  Authorized by: Robbi Diane MD    Preanesthetic Checklist  Completed: patient identified, IV checked, site marked, risks and benefits discussed, surgical consent, monitors and equipment checked, pre-op evaluation and timeout performed  Spinal Block  Patient position: sitting  Prep: ChloraPrep and site prepped and draped  Patient monitoring: frequent blood pressure checks, continuous pulse ox and heart rate  Approach: midline  Location: L3-4  Needle  Needle type: Pencan   Needle gauge: 24 G  Needle length: 4 in  Assessment  Sensory level: T4  Injection Assessment:  negative aspiration for heme, no paresthesia on injection and positive aspiration for clear CSF.  Post-procedure:  site cleaned

## 2024-10-01 NOTE — ANESTHESIA PREPROCEDURE EVALUATION
Procedure:  LAPAROTOMY EXPLORATORY (Abdomen)    Relevant Problems   GYN   (+) Pregnant      Hemoglobin    7.4 Low           Physical Exam    Airway    Mallampati score: II  TM Distance: >3 FB  Neck ROM: full     Dental       Cardiovascular      Pulmonary      Other Findings  post-pubertal.      Anesthesia Plan  ASA Score- 2 Emergent    Anesthesia Type- general with ASA Monitors.         Additional Monitors: arterial line and central venous line.    Airway Plan:            Plan Factors-    Chart reviewed.                      Induction-     Postoperative Plan-     Perioperative Resuscitation Plan - Level 1 - Full Code.       Informed Consent- Anesthetic plan and risks discussed with patient.  I personally reviewed this patient with the CRNA. Discussed and agreed on the Anesthesia Plan with the CRNA..

## 2024-10-01 NOTE — PROGRESS NOTES
Progress Note - OB/GYN   Name: Buffy Pope 34 y.o. female I MRN: [id number]  Unit/Bed#: LD PACU-01 I Date of Admission: 10/1/2024   Date of Service: 10/1/2024 I Hospital Day: 0     Assessment & Plan  Pregnant    No prenatal care in current pregnancy      At bedside in PACU due to low BP, 80/50s. HR wnl. Anesthesia at bedside and treating with meds + albumin. Abdominal exam notable for enlarged but firm uterine fundus, above umbilicus. Vaginal bleeding within normal limits.   Starting hgb 9.5 with QBL of 1253, sxs likely secondary to PPH.   BP recovered well with treatment, although patient now tachycardic   2U pRBCs ordered   Of note, surgery complicated by 4 cm hematoma on the left of the hysterotomy- for this reason, low threshold to scan with CT angio of abdomen/pelvis for ongoing bleeding. Can also consider bedside US. Will continue to closely monitor.     Social hx also collected at this time   Patient reports 1 prior - late term induction at 41 weeks. Pregnancy was uncomplicated   She also reports she was unable to get prenatal care this pregnancy due to lack of insurance  Patient verbally consents for HIV and UDS

## 2024-10-01 NOTE — LACTATION NOTE
CONSULT - LACTATION  Buffy Pope 34 y.o. female MRN: [id number]    [facility] ICU Room / Bed: ICU 14/ICU 14 Encounter: [id number]    Maternal Information     MOTHER:  N/A  Maternal Age: This patient's mother is not on file.  OB History: This patient's mother is not on file.  Previouse breast reduction surgery? No    Lactation history:   Has patient previously breast fed: No   How long had patient previously breast fed:     Previous breast feeding complications:     This patient's mother is not on file.    Birth information:  YOB: 1990   Time of birth:     Sex: female   Delivery type:     Birth Weight: No birth weight on file.   Percent of Weight Change: Birth weight not on file     Gestational Age: <None>   [unfilled]    Assessment     Breast and nipple assessment:  small breasts with nipples that ghulam      10/01/24 1600   Lactation Consultation   Reason for Consult 20;15 min   Risk Factors Language barrier  ( # [id number])   Maternal Information   Has mother  before? No   Exclusive Pump and Bottle Feed No   Breasts/Nipples   Date Pumping Initiated 10/01/24   Time Pumping Initiated 1600   Left Breast Soft   Right Breast Soft   Left Nipple Everted   Right Nipple Everted   Intervention Breast pump   Breastfeeding Status Yes   Breastfeeding Progress Pumping only   Reasons for not Breastfeeding Maternal medical condition   Breast Pump   Pump 2   Pump Review/Education Setup, frequency, and cleaning;Milk storage   Initiated by ROSE OLIVER   Date Initiated 10/01/24   Patient Follow-Up   Lactation Consult Status 2   Follow-Up Type Inpatient;Call as needed   Other OB Lactation Documentation    Additional Problem Noted Consulted in ICU for patient to be set up with pumping. Set mom up to pump; cycled through a pumping session. Enc mom to pump every 2-3 hours. Enc to call for further assistance.  (RSB and DC briefly reviewed.)        Feeding  recommendations:  pump every 2-3 hours    Pumping:   - When pumping, begin in stimulation mode (high cycle, low vacuum) until milk begins to express. Change pump to expression mode (low cycle, high vacuum). Use hands on pumping techniques to assist with milk transfer. When milk stops expressing, change back to stimulation mode. When milk begins to flow, change to expression mode. You may cycle pump up to three times in a pumping session.  Instructions given on pumping.  Discussed when to start, frequency, different pumps available versus manual expression.    Met with mother. Provided mother with Ready, Set, Baby booklet which contained information on:  Hand expression with access to QR codes to review hand expression.  Positioning and latch reviewed as well as showing images of other feeding positions.  Discussed the properties of a good latch in any position.   Feeding on cue and what that means for recognizing infant's hunger, s/s that baby is getting enough milk and some s/s that breastfeeding dyad may need further help  Skin to Skin contact an benefits to mom and baby  Avoidance of pacifiers for the first month discussed.   Gave information on common concerns, what to expect the first few weeks after delivery, preparing for other caregivers, and how partners can help. Resources for support also provided.    Met with mother to go over discharge breastfeeding booklet including the feeding log. Emphasized 8 or more (12) feedings in a 24 hour period, what to expect for the number of diapers per day of life and the progression of properties of the  stooling pattern.    List of reasons to call a lactation consultant.  Feeding logs  Feeding cues  Hand expression  Baby's Second day (cluster feeding)  Breastfeeding and Your Lifestyle (Medications, Alcohol, Caffeine, Smoking, Street Drugs, Methadone)  First Two Weeks Survival Guide for Breastfeeding  Breast Changes  Physical Therapy  Storage and Handling of Breast  milk  How to Keep Your Breast Pump Kit Clean  The Employed Breastfeeding Mother  Mixed feeding  Bottle feeding like breastfeeding (paced bottle feeding)  astfeeding and your lifestyle, storage and preparation of breast milk, how to keep you breast pump clean, the employed breastfeeding mother and paced bottle feeding handouts.     Booklet included Breastfeeding Resources for after discharge including access to the number for the [facility].      Mony Portillo MA 10/1/2024 4:25 PM

## 2024-10-01 NOTE — ASSESSMENT & PLAN NOTE
doing well and nursery  Routine postpartum care and reviewed with nursing to offer breast pump and reviewed expression if desired while in ICU.

## 2024-10-01 NOTE — OP NOTE
OPERATIVE REPORT  PATIENT NAME: Buffy Pope    :  1990  MRN: [id number]  Pt Location: AN OR ROOM 02    SURGERY DATE: 10/1/2024    Surgeons and Role:     * Judy Montes MD - Primary     * Kendal Dove MD - Assisting     * Tracie Tom MD - Assisting     * Ashley Strauss MD - Assisting     * Hakan Ware MD - Assisting     * Sonia Middleton MD - Assisting    Preop Diagnosis:  Hemorrhage [R58]    Post-Op Diagnosis Codes:     * Hemorrhage [R58]    Procedure(s) (LRB):  LAPAROTOMY EXPLORATORY, SUPRACERVICAL HYSTERECTOMY, BILATERAL SALPINGECTOMY, LEFT HYPOGASTRIC ARTERY LIGATION, CYSTOSCOPY (N/A)    Specimen(s):  ID Type Source Tests Collected by Time Destination   1 : Left paratubal cyst Tissue Cyst TISSUE EXAM Judy Montes MD 10/1/2024 0913    2 : Bilateral fallopian tubes and uterus Tissue Uterus TISSUE EXAM Judy Montes MD 10/1/2024 1027        Surgical QBL:  Surgical QBL (mL): 1253 mL      Drains:  Urethral Catheter Non-latex 16 Fr. (Active)   Reasons to continue Urinary Catheter  Post-operative urological requirements 10/01/24 0339   Goal for Removal Remove POD#1 10/01/24 0339   Site Assessment Clean;Skin intact 10/01/24 1245   Collection Container Standard drainage bag 10/01/24 1245   Securement Method Securing device (Describe) 10/01/24 1245   Number of days: 0       Anesthesia Type:   General    Operative Indications:  Hemorrhage [R58]    Operative Findings:  Please see prior operative note by Dr. Middleton for operative findings.    Complications:   None    Procedure and Technique:  Please see prior operative note by Dr. Middleton for retroperitoneal dissection, left internal iliac ligation and  hysterectomy- abdominal closure following laparotomy occurred as follows.    The vertical fascial incision was closed with loop 0-PDS in a running fashion followed by the closure of the Pfannenstiel incision by a separate 0 PDS return  in unlocked running fashion Incorporating the vertical incision fascia.  Subcutaneous layer was then irrigated and Bovie electrocautery was used for hemostasis.  The subcutaneous tissue was then closed using 2 running unlocked suture of 2-0 plain gut suture.  A subdermal running unlocked suture of 3-0 Vicryl was then performed on the vertical limb Pfannenstiel incisions to alleviate tension on the skin closure.  The skin was then closed using 4-0 Monocryl.  Exofin was used atop the incision.  A pressure dressing using Telfa, 4 x 4's and ABD pads was then applied.    Vaginal exam performed with minimal vaginal bleeding noted.     X-ray was performed given and appropriate counts of needles and radiology no radiopaque metallic foreign body noted with appearance of suture needle-likely secondary to initial count error.    Remaining counts were correct x2.     Case and procedure were reviewed with patient  in addition to closer monitoring under ICU care.   All questions and concerns were addressed.    Patient Disposition:  PACU to ICU for closer monitoring.      SIGNATURE: Kendal Dove MD  DATE: October 1, 2024  TIME: 1:05 PM

## 2024-10-01 NOTE — PROGRESS NOTES
Discussed CT findings with radiologist on call- left 15 cm hematoma extending into the parametrium with active bleeding from the left internal iliac noted. Given patients current Bps of 80/50s with HR elevated to the 130s, will take back to OR for ex lap with evacuation of hematoma and possible hysterectomy.     Blood actively transfusing, patient appears diaphoretic and pale with worsening pain. Additional blood ordered - 4u pRBCs and 4 u FFP as well as coags and repeat hgb.     Discussed findings and recommendations with patient and her partner- they are in agreement with plan

## 2024-10-01 NOTE — ANESTHESIA PREPROCEDURE EVALUATION
Procedure:   SECTION () (Uterus)    Relevant Problems   No relevant active problems      Hand prolapse.  Physical Exam    Airway    Mallampati score: II         Dental   No notable dental hx     Cardiovascular      Pulmonary      Other Findings  post-pubertal.      Anesthesia Plan  ASA Score- 2 Emergent    Anesthesia Type- spinal with ASA Monitors.         Additional Monitors:     Airway Plan:     Comment: I, Dr. Diane, the attending physician, have personally seen and evaluated the patient prior to anesthetic care.  I have reviewed the pre-anesthetic record, and other medical records if appropriate to the anesthetic care.  If a CRNA is involved in the case, I have reviewed the CRNA assessment, if present, and agree.  The patient is in a suitable condition to proceed with my formulated anesthetic plan.  .       Plan Factors-    Induction-     Postoperative Plan-     Perioperative Resuscitation Plan - Level 1 - Full Code.       Informed Consent- Anesthetic plan and risks discussed with patient.  I personally reviewed this patient with the CRNA. Discussed and agreed on the Anesthesia Plan with the CRNA..

## 2024-10-01 NOTE — OP NOTE
OPERATIVE REPORT  PATIENT NAME: Buffy Pope    :  1990  MRN: [id number]  Pt Location: AN OR ROOM 02    SURGERY DATE: 10/1/2024    Surgeons and Role:     * Sonia Middleton MD - Primary     * Judy Montes MD - Assisting     * Kendal Dove MD - Assisting     * Tracie oTm MD - Assisting     * Ashley Strauss MD - Assisting     * Hakan Ware MD - Assisting    Preop Diagnosis:  Hemorrhage [R58]    Post-Op Diagnosis Codes:     * Hemorrhage [R58]    Procedure(s):  LAPAROTOMY EXPLORATORY. SUPRACERVICAL HYSTERECTOMY. BILATERAL SALPINGECTOMY. LEFT HYPOGASTRIC ARTERY LIGATION. CYSTOSCOPY    Specimen(s):  ID Type Source Tests Collected by Time Destination   1 : Left paratubal cyst Tissue Cyst TISSUE EXAM Judy Montes MD 10/1/2024  9:13 AM    2 : Bilateral fallopian tubes and uterus Tissue Uterus TISSUE EXAM Judy Montes MD 10/1/2024 10:27 AM        Estimated Blood Loss:   2L including clot evacuation  Received 4U PRBCs, 3U FFP, 1U platelets intraoperatively    Drains:  Urethral Catheter Non-latex 16 Fr. (Active)   Reasons to continue Urinary Catheter  Post-operative urological requirements 10/01/24 0339   Goal for Removal Remove POD#1 10/01/24 0339   Site Assessment Clean;Skin intact 10/01/24 0339   Collection Container Standard drainage bag 10/01/24 0339   Securement Method Securing device (Describe) 10/01/24 0339   Number of days: 0       Anesthesia Type:   General    Operative Indications:  Hemorrhage [R58]  Postoperative left uterine artery hemorrhage  Uterine atony    Operative Findings:  - Large amount of organized clot in the left retroperitoneum extending from the left bladder flap to above the pelvic brim with ongoing active bleeding from the internal iliac artery. Left retroperitoneum explored with good visualization of left common iliac, external iliac vessels, internal iliac vessels, and ureter  - Left hypogastric artery ligation completed  with subsequent control of active hemorrhage  - Normal appearing bilateral ovaries and fallopian tubes with small 3cm left sided paratubal cyst    - Cystoscopy: bubble at dome of bladder with no evidence of bladder injury, bilateral ureteral orifices visualized with brisk ureteral jets bilaterally    - Boggy 18week uterus without good uterine tone after administration of methergine, hemabate, rectal cytotec, TXA, and Allyson intrauterine device requiring hysterectomy  - Bilateral ureters identified vermiculating during hysterectomy   - At completion of hysterectomy, SurgiFlo placed with adequate hemostasis      Complications:   None    Procedure and Technique:  I was advised of this patient by the labor & delivery team with the need for emergent takeback due to retroperitoneal hematoma with active bleeding from left internal iliac artery after  delivery. She was counseled by the primary labor team. I was present after intubation but prior to incision.    An appropriate time-out procedure was completed with all members of the operating room team present. She was then prepped and draped in usual sterile fashion. She had a Rogers catheter in place from her earlier  section.    The patient's Pfannenstiel skin incision was reopened. Visualization was inadequate so a midline vertical skin incision was made with the scalpel and carried down to the fascia with bovie cautery.  The fascia was incised and extended superiorly. The peritoneum was bluntly opened.    Exploration of the abdomen and pelvis revealed the above noted findings. A Book Segun retractor was placed and the bowels were carefully packed away with moist sponges.     On the left side, the round ligament had already been opened during her prior  section. A Tracie clamp was placed on the left cornua of the uterus and the retroperitoneum was further opened along the pelvic sidewall along the white line of Toldt. A bladder flap was started on  the left side to facilitate retroperitoneal exploration. A large amount of organized clot was bluntly removed from the retroperitoneum until adequate visualization could be obtained. The psoas, common iliac vessels, external iliac vessels, internal iliac vessels, and left ureter were identified. The left internal iliac artery was traced distally to try to identify the area of ongoing hemorrhage. Active bleeding was noted in the distal internal iliac however the site of bleeding could not be fully visualized. The internal iliac artery was carefully dissected from surrounding tissues taking care to avoid the external artery and vein and ureter. A right angle clamp was passed underneath the internal iliac artery from lateral to medial and suture ligated with silk ties. Subsequent hemostasis was adequate with no ongoing active retroperitoneal bleeding distal to the area of suture ligation. SurgiFlo was placed in the left retroperitoneum.    Due to the amount of dissection and distortion of tissue planes due to the hematoma, cystoscopy was performed. The Rogesr was clamped off and a 70 degree rigid cystoscope was placed in the bladder. 300cc of normal saline was instilled. There was no evidence of injury to the bladder and both ureteral orifices were seen with brisk ureteral jets bilaterally.    Attention was returned to the abdomen. The uterus remained boggy with ongoing bleeding through the vagina. Methergine, hemabate, rectal cytotec, and TXA were administered. A Allyson device was inserted into the uterus and hooked up to suction, however there was still not adequate uterine tone. At this point, decision was made to proceed with hysterectomy.    The left round ligament was suture ligated and opened with Bovie electrocautery. The retroperitoneum was opened along the pelvic side wall to reveal the psoas and external iliacs. The right ureter was identified retroperitoneally along the medial leaf of the broad ligament.      The procedure was then completed bilaterally in identical fashion except where noted otherwise. A  window was made superior to the ureters in the avascular space of Graves. The fallopian tubes were dissected from the mesosalpinx with the Enseal device and the utero-ovarian vessels were coagulated and cut with the Enseal device with good hemostasis. The anterior and posterior leafs of the broad ligament were dropped to the lower uterine segment. Anteriorly, this incision was carried out to initiate creation of a bladder flap and the bladder was dissected off the cervix. The uterine vessels were skeletonized bilaterally. The bilateral uterine vessels were then clamped, cut, and suture ligated with good hemostasis bilaterally. Descending bites were taken bilaterally in a similar fashion with suture ligation to the level of the internal cervical os. The vagina was clamped with Zeppelins and a Kaila scissors was used to remove the uterus and bilateral fallopian tubes. The angles of the remaining cervical tissue were transfixed with #0 Vicryl in a Zay stitch and the remaining cervical canal was then closed with #0 Vicryl in interrupted fashion. Surgiflo was placed in the bilateral retroperitoneal spaces with adequate hemostasis.     An abdominal survey was completed with adequate hemostasis. The laparotomy sponges were removed and retractor taken down.     At this point, I turned the remaining abdominal closure over to the labor team.      I was present for critical portions of the procedure.    Patient Disposition:  hemodynamically stable    SIGNATURE: Sonia Middleton MD  DATE: October 1, 2024  TIME: 2:23 PM

## 2024-10-01 NOTE — ASSESSMENT & PLAN NOTE
34-year-old G2, P2 postop day 0 from  delivery followed by return to the OR for retroperitoneal and broad ligament hematoma and hemodynamic instability now status post exploratory laparotomy, supracervical  hysterectomy, ligation of the left internal iliac artery and cystoscopy.  She is status post 6U PRBC, 3U FFP, 1 pack platelet  Patient now hemodynamically stable and ICU.  Appreciate excellent care from GYN oncology and ICU colleagues.  Continue to trend labs and transfuse as needed.  Continue close monitoring of I/Os  Pain management per ICU team-did review with ICU PA that we do typically offer her Dilaudid PCA following extensive surgery completed.  Reviewed recommended n.p.o. for now and plan to advance diet as tolerated.

## 2024-10-01 NOTE — H&P
H&P - OB/GYN   Name: Buffy Pope 34 y.o. female I MRN: [id number]  Unit/Bed#: LD PACU-01 I Date of Admission: 10/1/2024   Date of Service: 10/1/2024 I Hospital Day: 0     Assessment & Plan  Pregnant    No prenatal care in current pregnancy    Buffy Pope 34 y.o.  at [id number] in labor with fetal arm in vagina  - dated by LMP consistent with first trimester US   - proceed to OR urgently for  delivery   - hgb 9.5 on admission   - no prenatal care this pregnancy. Prenatal labs ordered     History of Present Illness   Patient of: [facility]   Brief Summary: Buffy Pope   at 41w4d with an MATTHEW of 24 by LMP/first tri US presented to [facility] ED for labor, noted to have ROM on exam with fetal hand and suspected cord prolapse. FHT noted to be 150-180s by [facility] ED provider. I spoke to the provider and requested elevation of the fetal head through the vagina and continuous fetal heart tone monitor via ultrasound and transfer to [facility] for  capabilities, as well as ampicillin and azithro for GBS unknown and ruptured status. When the patient arrived at [facility], FHT were confirmed to be in the 150s via US. Vaginal exam was notable for hand presentation without a cord prolapse present and the patient was taken urgently for  delivery.     - history of 1 prior vaginal delivery  - NKDA   - History of appy     Chief Complaint: abdominal pain     HPI:  Contractions: yes.   Leakage of fluid:  yes .   Bleeding: None    Pregnancy complications: No prenatal care     Review of Systems   All other systems reviewed and are negative.      Historical Information   I have reviewed the patient's PMH, PSH, Social History, Family History, Meds, and Allergies  OB History    Para Term  AB Living   1             SAB IAB Ectopic Multiple Live Births                  # Outcome Date GA Lbr Jose/2nd Weight Sex Type Anes PTL Lv   1 Current                Objective      Temp:   "[97.5 °F (36.4 °C)] 97.5 °F (36.4 °C)  HR:  [] 115  Resp:  [20] 20  BP: ()/(47-94) 101/59  O2 Device: Simple mask          I/O last 24 hours:  In: 1900 [I.V.:1900]  Out: 1407 [Urine:200; Blood:1207]  Lines/Drains/Airways       Active Status       Name Placement date Placement time Site Days    Urethral Catheter Non-latex 16 Fr. 10/01/24  0304  Non-latex  less than 1                  Physical Exam  Vitals and nursing note reviewed.   Constitutional:       General: She is not in acute distress.     Appearance: She is well-developed.   HENT:      Head: Normocephalic and atraumatic.   Eyes:      Conjunctiva/sclera: Conjunctivae normal.   Cardiovascular:      Rate and Rhythm: Normal rate and regular rhythm.      Heart sounds: No murmur heard.  Pulmonary:      Effort: Pulmonary effort is normal. No respiratory distress.      Breath sounds: Normal breath sounds.   Abdominal:      General: There is no distension.      Palpations: Abdomen is soft.      Tenderness: There is no abdominal tenderness. There is no guarding.   Musculoskeletal:         General: No swelling.      Cervical back: Neck supple.   Skin:     General: Skin is warm and dry.      Capillary Refill: Capillary refill takes less than 2 seconds.   Neurological:      Mental Status: She is alert.   Psychiatric:         Mood and Affect: Mood normal.          Cervical Exam   6 cm with fetal arm through cervix   Contractions:   Chris uncomfortable   Fetal heart rate   FHT 150s     Lab Results: I have reviewed the following results:   Hgb 9.5     Type & Screen:  ABO Grouping   Date Value Ref Range Status   10/01/2024 O  Final     Rh Factor   Date Value Ref Range Status   10/01/2024 Positive  Final     No results found for: \"ANTIBODYSCR\"  Specimen Expiration Date   Date Value Ref Range Status   10/01/2024 [id number]  Final       "

## 2024-10-01 NOTE — QUICK NOTE
Present at the bedside for symptomatic hypotension. She was noted to have a BP of of 80s/50s. She is lightheaded and dizzy without tachycardia (BP 70s). Anesthesia present at the bedside and gave albumin. Bimanual exam notable for a firm uterus with fundus deviated to the right and 3cm above the umbilicus. Stat CBC ordered. Will consider CT angiogram if clinical exam concerning for hemorrhage. Dr. Montes present at the bedside during discussion.     Faith Liu MD  OBGYN PGY-4  10/01/24 5:08 AM

## 2024-10-01 NOTE — ASSESSMENT & PLAN NOTE
Routine postpartum care and reviewed with nursing to offer breast pump and reviewed expression if desired while in ICU.

## 2024-10-01 NOTE — NURSING NOTE
This RN spoke to Aracelis in lab at 0453 to change patients CBC with patients to STAT. Aracelis indicated she would have chemistry update it from routine to STAT.

## 2024-10-01 NOTE — QUICK NOTE
Present at the bedside due to Hgb drop from 10.5 to 6.3. In brief, patient had a stat 1LTCS for fetal malpresentation at 41w complicated by retroperitoneal hemorrhage requiring ex lap, supracervical hysterectomy, left hypogastric artery ligation and  hysterectomy.     At bedside patient is alert and oriented with mild/moderate pallor. She endorses some abdominal pain. On physical exam, abdomen is soft, minimally tender with appreciable distension and tympany. Vitals significant for hypotension of 80s/60s, tachycardia to 140.     Discussed with Gyn Onc Fellow and ICU AP regarding FAST exam, repeat CBC and coags and potential additional imaging based on findings of FAST exam. Dr. Dove present at bedside for evaluation of Buffy.     Faith Liu MD  OBGYN PGY-4  10/01/24 6:04 PM

## 2024-10-01 NOTE — ED PROCEDURE NOTE
PROCEDURE  CriticalCare Time    Date/Time: 10/1/2024 2:41 AM    Performed by: Say Hathaway MD  Authorized by: Say Hathaway MD    Critical care provider statement:     Critical care time (minutes):  80    Critical care time was exclusive of:  Separately billable procedures and treating other patients and teaching time    Critical care was time spent personally by me on the following activities:  Obtaining history from patient or surrogate, development of treatment plan with patient or surrogate, discussions with consultants, examination of patient, re-evaluation of patient's condition and ordering and performing treatments and interventions       Say Hathaway MD  10/01/24 0841

## 2024-10-01 NOTE — PLAN OF CARE

## 2024-10-01 NOTE — EMTALA/ACUTE CARE TRANSFER
[facility] EMERGENCY DEPARTMENT  [address] [place] [id number]  Dept: [phone]      EMTALA TRANSFER CONSENT    NAME Buffy Pope                                         1990                              MRN [id number]    I have been informed of my rights regarding examination, treatment, and transfer   by Dr. Say Hathaway MD    Benefits: Specialized equipment and/or services available at the receiving facility (Include comment)________________________    Risks: Potential for delay in receiving treatment      Consent for Transfer:  I acknowledge that my medical condition has been evaluated and explained to me by the emergency department physician or other qualified medical person and/or my attending physician, who has recommended that I be transferred to the service of  Accepting Physician: Dr Tapia at Accepting Facility Name, City & State : [facility]. The above potential benefits of such transfer, the potential risks associated with such transfer, and the probable risks of not being transferred have been explained to me, and I fully understand them.  The doctor has explained that, in my case, the benefits of transfer outweigh the risks.  I agree to be transferred.    I authorize the performance of emergency medical procedures and treatments upon me in both transit and upon arrival at the receiving facility.  Additionally, I authorize the release of any and all medical records to the receiving facility and request they be transported with me, if possible.  I understand that the safest mode of transportation during a medical emergency is an ambulance and that the Hospital advocates the use of this mode of transport. Risks of traveling to the receiving facility by car, including absence of medical control, life sustaining equipment, such as oxygen, and medical personnel has been explained to me and I fully understand them.    (TROY CORRECT BOX BELOW)  [  ]  I  consent to the stated transfer and to be transported by ambulance/helicopter.  [  ]  I consent to the stated transfer, but refuse transportation by ambulance and accept full responsibility for my transportation by car.  I understand the risks of non-ambulance transfers and I exonerate the Hospital and its staff from any deterioration in my condition that results from this refusal.    X___________________________________________    DATE  10/01/24  TIME________  Signature of patient or legally responsible individual signing on patient behalf           RELATIONSHIP TO PATIENT_________________________          Provider Certification    NAME Buffy Pope                                         1990                              MRN [id number]    A medical screening exam was performed on the above named patient.  Based on the examination:    Condition Necessitating Transfer The encounter diagnosis was  (spontaneous vaginal delivery).    Patient Condition: The patient has been stabilized such that within reasonable medical probability, no material deterioration of the patient condition or the condition of the unborn child(natasha) is likely to result from the transfer    Reason for Transfer: Level of Care needed not available at this facility    Transfer Requirements: Facility [facility]   Space available and qualified personnel available for treatment as acknowledged by    Agreed to accept transfer and to provide appropriate medical treatment as acknowledged by       Dr Tapia  Appropriate medical records of the examination and treatment of the patient are provided at the time of transfer   STAFF INITIAL WHEN COMPLETED _______  Transfer will be performed by qualified personnel from    and appropriate transfer equipment as required, including the use of necessary and appropriate life support measures.    Provider Certification: I have examined the patient and explained the following risks and benefits of  being transferred/refusing transfer to the patient/family:  General risk, such as traffic hazards, adverse weather conditions, rough terrain or turbulence, possible failure of equipment (including vehicle or aircraft), or consequences of actions of persons outside the control of the transport personnel      Based on these reasonable risks and benefits to the patient and/or the unborn child(natasha), and based upon the information available at the time of the patient’s examination, I certify that the medical benefits reasonably to be expected from the provision of appropriate medical treatments at another medical facility outweigh the increasing risks, if any, to the individual’s medical condition, and in the case of labor to the unborn child, from effecting the transfer.    X____________________________________________ DATE 10/01/24        TIME_______      ORIGINAL - SEND TO MEDICAL RECORDS   COPY - SEND WITH PATIENT DURING TRANSFER

## 2024-10-01 NOTE — OP NOTE
Section Operative Report - OB/GYN   Buffy Pope 34 y.o. female MRN: [id number]  Unit/Bed#: LD PACU- Encounter: [id number]    Indications: malpresentation     Pre-operative Diagnosis:   1. 41 week 4 day pregnancy  2. Malpresentation   3. No prenatal care     Post-operative Diagnosis: same, delivered    Surgeon: Judy Montes MD    Assistant(s): Faith Liu, Judy Espinosa, Tracie Manrique     Findings:  1. Delivery of viable female on 10/01/24 at 41w4d, weight 10lbs 14oz;  Apgar scores of 3 at one minute and 9 at five minutes.   2. Normal appearing placenta with centrally-inserted 3 vessel cord  3. Meconium stained amniotic fluid  4. Left paratubal cyst     Specimens:   1. Arterial and venous cord gases  2. Cord blood  3. Segment of umbilical cord  4. Placenta to pathology     Estimated Blood Loss:  1.2L            Total IV Fluids:   Intake/Output Summary (Last 24 hours) at 10/1/2024 0543  Last data filed at 10/1/2024 0417  Gross per 24 hour   Intake 1900 ml   Output 1453 ml   Net 447 ml         Drains: Rogers catheter           Complications:  Left 4 cm hematoma, nonexpanding            Disposition: PACU            Condition: stable    Procedure Details   Patient presented to [place] in ED in labor. Exam by ED physician was notable for hand presentation and possible cord prolapse. Patient was transferred to [place] with MD provider providing fetal head elevation throughout her transfer. Fetal heart tones were monitored by [place] ED providers and were noted to be 150s-180s. She received ampicillin and azithromycin prior to transfer. When she arrived to [place], fetal hearts were 150s and exam was notable for hand presentation through the vagina at 6 cm dilation and no palpable presenting cord. Decision was made to proceed with  section due to fetal malpresentation. Patient was made aware of these findings and the proposed plan. The patient agreed with the proposed plan and gave informed  consent.      The patient was taken to the operating room where she was properly identified to the OR staff and attending physician. Spinal anesthesia was obtained.  Fetal heart tones were appreciated and found to be appropriate. A Rogers catheter was aseptically inserted and SCDs were in placed.  The abdomen was prepped with Chloraprep and following appropriate drying time, the patient was draped in the usual sterile manner for a Pfannenstiel incision.  The patient had received Ancef 2g IV pre-operatively for prophylaxis.  A Time Out was held and the above information confirmed.  The patient was identified as Buffy Pope and the procedure verified as  Delivery.    A Pfannenstiel incision was made and carried down through the underlying subcutaneous tissue to the fascia using a scalpel. Rectus fascia was bluntly dissected in a stat fashion. The rectus muscles were  and the peritoneum was identified and subsequently entered and extended longitudinally with blunt dissection. The bladder blade was inserted.      A low transverse uterine incision was made with the scalpel and extended laterally with blunt dissection. The amnion was entered sharply.  Surgeons hand was inserted through the hysterotomy and the fetal arm was noted to be the presenting part. The fetal head was palpated on the maternal right and attempt was made to deliver cephalic, which was unsuccessful. Dr. Espinosa and Dr. Manrique were called to OR. The fetus was delivered breach, the legs were brought to hysterotomy and delivered, followed by the trunk, the fetal arms, and the fetal head. The umbilical cord was immediately clamped and cut and the  was handed to the NICU team.  Arterial and venous cord gases, cord blood, and a segment of umbilical cord were obtained for evaluation and promptly sent to the lab.  The placenta delivered spontaneously with uterine fundal massage and was noted to have a 3 vessel cord. This was also  sent to the lab for placental pathology.     The uterus was exteriorized and a moist lap sponge was used to clear the cavity of clots and products of conception. The uterine incision was closed with a running locked suture of 0 Vicryl. A second layer of the same suture was used to imbricate the first. The left uterine artery was noted to be skeletonized due to a left extension. Attempt to oversow the extension resulted in a 4 cm hematoma at the left hysterotomy. Pressure was applied and the hematoma was compressed using 0 monocryl in a running fashion. The hematoma was observed and was noted to be stable. Nuknit was placed over the left extension. Good hemostasis was confirmed upon uterine closure. The uterus was returned to the abdomen.  The paracolic gutters were inspected and cleared of all clots and debris with irrigation and moist lap sponges.  The fascia was closed with a running suture of 0 Vicryl.  Subcutaneous tissues were closed with 2-0 mocryl suture. The skin was closed with 4-0 Monocryl in a subcuticular fashion.  Sterile mepilex dressing was applied and an abdominal binder was then placed.     At the conclusion of the procedure, all needle, sponge, and instrument counts were noted to be correct x2.  The patient tolerated the procedure well and was transferred to her the recovery room in stable condition.

## 2024-10-02 PROBLEM — G89.18 POST-OPERATIVE PAIN: Status: ACTIVE | Noted: 2024-10-02

## 2024-10-02 LAB
ABO GROUP BLD BPU: NORMAL
ALBUMIN SERPL BCG-MCNC: 2.3 G/DL (ref 3.5–5)
ALP SERPL-CCNC: 75 U/L (ref 34–104)
ALT SERPL W P-5'-P-CCNC: 8 U/L (ref 7–52)
ANION GAP SERPL CALCULATED.3IONS-SCNC: 4 MMOL/L (ref 4–13)
ANION GAP SERPL CALCULATED.3IONS-SCNC: 5 MMOL/L (ref 4–13)
AST SERPL W P-5'-P-CCNC: 15 U/L (ref 13–39)
BILIRUB SERPL-MCNC: 0.44 MG/DL (ref 0.2–1)
BPU ID: NORMAL
BUN SERPL-MCNC: 11 MG/DL (ref 5–25)
BUN SERPL-MCNC: 8 MG/DL (ref 5–25)
CA-I BLD-SCNC: 1.11 MMOL/L (ref 1.12–1.32)
CA-I BLD-SCNC: 1.11 MMOL/L (ref 1.12–1.32)
CA-I BLD-SCNC: 1.12 MMOL/L (ref 1.12–1.32)
CALCIUM ALBUM COR SERPL-MCNC: 9.1 MG/DL (ref 8.3–10.1)
CALCIUM SERPL-MCNC: 7.7 MG/DL (ref 8.4–10.2)
CALCIUM SERPL-MCNC: 8.1 MG/DL (ref 8.4–10.2)
CFFMA (FUNCTIONAL FIBRINOGEN MAX AMPLITUDE): 24.4 MM (ref 15–32)
CHLORIDE SERPL-SCNC: 104 MMOL/L (ref 96–108)
CHLORIDE SERPL-SCNC: 110 MMOL/L (ref 96–108)
CKLY30: 0 % (ref 0–2.6)
CKR(REACTION TIME): 4.6 MIN (ref 4.6–9.1)
CO2 SERPL-SCNC: 23 MMOL/L (ref 21–32)
CO2 SERPL-SCNC: 26 MMOL/L (ref 21–32)
CREAT SERPL-MCNC: 0.46 MG/DL (ref 0.6–1.3)
CREAT SERPL-MCNC: 0.62 MG/DL (ref 0.6–1.3)
CROSSMATCH: NORMAL
CRTMA(RAPIDTEG MAX AMPLITUDE): 64.2 MM (ref 52–70)
ERYTHROCYTE [DISTWIDTH] IN BLOOD BY AUTOMATED COUNT: 16.3 % (ref 11.6–15.1)
ERYTHROCYTE [DISTWIDTH] IN BLOOD BY AUTOMATED COUNT: 16.7 % (ref 11.6–15.1)
ERYTHROCYTE [DISTWIDTH] IN BLOOD BY AUTOMATED COUNT: 17 % (ref 11.6–15.1)
FIBRINOGEN PPP-MCNC: 368 MG/DL (ref 206–523)
FIBRINOGEN PPP-MCNC: 424 MG/DL (ref 206–523)
FIBRINOGEN PPP-MCNC: 499 MG/DL (ref 206–523)
GFR SERPL CREATININE-BSD FRML MDRD: 118 ML/MIN/1.73SQ M
GFR SERPL CREATININE-BSD FRML MDRD: 130 ML/MIN/1.73SQ M
GLUCOSE SERPL-MCNC: 100 MG/DL (ref 65–140)
GLUCOSE SERPL-MCNC: 75 MG/DL (ref 65–140)
HCT VFR BLD AUTO: 20.9 % (ref 34.8–46.1)
HCT VFR BLD AUTO: 22.1 % (ref 34.8–46.1)
HCT VFR BLD AUTO: 23.5 % (ref 34.8–46.1)
HCT VFR BLD AUTO: 27.8 % (ref 34.8–46.1)
HGB BLD-MCNC: 7.1 G/DL (ref 11.5–15.4)
HGB BLD-MCNC: 7.7 G/DL (ref 11.5–15.4)
HGB BLD-MCNC: 7.9 G/DL (ref 11.5–15.4)
HGB BLD-MCNC: 8.1 G/DL (ref 11.5–15.4)
HGB BLD-MCNC: 9.6 G/DL (ref 11.5–15.4)
INR PPP: 0.97 (ref 0.85–1.19)
INR PPP: 0.97 (ref 0.85–1.19)
INR PPP: 0.98 (ref 0.85–1.19)
MAGNESIUM SERPL-MCNC: 1.6 MG/DL (ref 1.9–2.7)
MAGNESIUM SERPL-MCNC: 1.7 MG/DL (ref 1.9–2.7)
MCH RBC QN AUTO: 28.2 PG (ref 26.8–34.3)
MCH RBC QN AUTO: 28.5 PG (ref 26.8–34.3)
MCH RBC QN AUTO: 28.7 PG (ref 26.8–34.3)
MCHC RBC AUTO-ENTMCNC: 34 G/DL (ref 31.4–37.4)
MCHC RBC AUTO-ENTMCNC: 34.5 G/DL (ref 31.4–37.4)
MCHC RBC AUTO-ENTMCNC: 34.8 G/DL (ref 31.4–37.4)
MCV RBC AUTO: 82 FL (ref 82–98)
MCV RBC AUTO: 83 FL (ref 82–98)
MCV RBC AUTO: 84 FL (ref 82–98)
PHOSPHATE SERPL-MCNC: 3.2 MG/DL (ref 2.7–4.5)
PLATELET # BLD AUTO: 107 THOUSANDS/UL (ref 149–390)
PLATELET # BLD AUTO: 108 THOUSANDS/UL (ref 149–390)
PMV BLD AUTO: 10.8 FL (ref 8.9–12.7)
PMV BLD AUTO: 11 FL (ref 8.9–12.7)
PMV BLD AUTO: 11.1 FL (ref 8.9–12.7)
POTASSIUM SERPL-SCNC: 3.7 MMOL/L (ref 3.5–5.3)
POTASSIUM SERPL-SCNC: 4.1 MMOL/L (ref 3.5–5.3)
PROT SERPL-MCNC: 4.1 G/DL (ref 6.4–8.4)
PROTHROMBIN TIME: 13.6 SECONDS (ref 12.3–15)
PROTHROMBIN TIME: 13.6 SECONDS (ref 12.3–15)
PROTHROMBIN TIME: 13.7 SECONDS (ref 12.3–15)
RBC # BLD AUTO: 2.49 MILLION/UL (ref 3.81–5.12)
RBC # BLD AUTO: 2.68 MILLION/UL (ref 3.81–5.12)
RBC # BLD AUTO: 2.87 MILLION/UL (ref 3.81–5.12)
SODIUM SERPL-SCNC: 135 MMOL/L (ref 135–147)
SODIUM SERPL-SCNC: 137 MMOL/L (ref 135–147)
TSH SERPL DL<=0.05 MIU/L-ACNC: 5.08 UIU/ML (ref 0.45–4.5)
UNIT DISPENSE STATUS: NORMAL
UNIT PRODUCT CODE: NORMAL
UNIT PRODUCT VOLUME: 250 ML
UNIT PRODUCT VOLUME: 280 ML
UNIT PRODUCT VOLUME: 300 ML
UNIT PRODUCT VOLUME: 350 ML
UNIT RH: NORMAL
WBC # BLD AUTO: 9.2 THOUSAND/UL (ref 4.31–10.16)
WBC # BLD AUTO: 9.38 THOUSAND/UL (ref 4.31–10.16)
WBC # BLD AUTO: 9.99 THOUSAND/UL (ref 4.31–10.16)

## 2024-10-02 PROCEDURE — 30233N1 TRANSFUSION OF NONAUTOLOGOUS RED BLOOD CELLS INTO PERIPHERAL VEIN, PERCUTANEOUS APPROACH: ICD-10-PCS

## 2024-10-02 PROCEDURE — 85384 FIBRINOGEN ACTIVITY: CPT | Performed by: PHYSICIAN ASSISTANT

## 2024-10-02 PROCEDURE — 85018 HEMOGLOBIN: CPT

## 2024-10-02 PROCEDURE — 84439 ASSAY OF FREE THYROXINE: CPT

## 2024-10-02 PROCEDURE — 80048 BASIC METABOLIC PNL TOTAL CA: CPT | Performed by: NURSE PRACTITIONER

## 2024-10-02 PROCEDURE — 83735 ASSAY OF MAGNESIUM: CPT | Performed by: PHYSICIAN ASSISTANT

## 2024-10-02 PROCEDURE — 83735 ASSAY OF MAGNESIUM: CPT | Performed by: NURSE PRACTITIONER

## 2024-10-02 PROCEDURE — 82330 ASSAY OF CALCIUM: CPT | Performed by: PHYSICIAN ASSISTANT

## 2024-10-02 PROCEDURE — 85027 COMPLETE CBC AUTOMATED: CPT | Performed by: PHYSICIAN ASSISTANT

## 2024-10-02 PROCEDURE — 84443 ASSAY THYROID STIM HORMONE: CPT

## 2024-10-02 PROCEDURE — 85576 BLOOD PLATELET AGGREGATION: CPT

## 2024-10-02 PROCEDURE — 85014 HEMATOCRIT: CPT

## 2024-10-02 PROCEDURE — P9016 RBC LEUKOCYTES REDUCED: HCPCS

## 2024-10-02 PROCEDURE — 85384 FIBRINOGEN ACTIVITY: CPT

## 2024-10-02 PROCEDURE — 84100 ASSAY OF PHOSPHORUS: CPT | Performed by: PHYSICIAN ASSISTANT

## 2024-10-02 PROCEDURE — 80053 COMPREHEN METABOLIC PANEL: CPT | Performed by: PHYSICIAN ASSISTANT

## 2024-10-02 PROCEDURE — 85347 COAGULATION TIME ACTIVATED: CPT

## 2024-10-02 PROCEDURE — 85610 PROTHROMBIN TIME: CPT | Performed by: PHYSICIAN ASSISTANT

## 2024-10-02 PROCEDURE — 99024 POSTOP FOLLOW-UP VISIT: CPT | Performed by: OBSTETRICS & GYNECOLOGY

## 2024-10-02 PROCEDURE — 85397 CLOTTING FUNCT ACTIVITY: CPT

## 2024-10-02 PROCEDURE — 99232 SBSQ HOSP IP/OBS MODERATE 35: CPT | Performed by: STUDENT IN AN ORGANIZED HEALTH CARE EDUCATION/TRAINING PROGRAM

## 2024-10-02 RX ORDER — MAGNESIUM SULFATE HEPTAHYDRATE 40 MG/ML
2 INJECTION, SOLUTION INTRAVENOUS ONCE
Status: COMPLETED | OUTPATIENT
Start: 2024-10-02 | End: 2024-10-02

## 2024-10-02 RX ORDER — DEXTROSE MONOHYDRATE 25 G/50ML
25 INJECTION, SOLUTION INTRAVENOUS ONCE
Status: DISCONTINUED | OUTPATIENT
Start: 2024-10-02 | End: 2024-10-02

## 2024-10-02 RX ORDER — FUROSEMIDE 10 MG/ML
20 INJECTION INTRAMUSCULAR; INTRAVENOUS ONCE
Status: COMPLETED | OUTPATIENT
Start: 2024-10-02 | End: 2024-10-02

## 2024-10-02 RX ORDER — POTASSIUM CHLORIDE 14.9 MG/ML
20 INJECTION INTRAVENOUS ONCE
Status: COMPLETED | OUTPATIENT
Start: 2024-10-02 | End: 2024-10-02

## 2024-10-02 RX ADMIN — SODIUM CHLORIDE, SODIUM LACTATE, POTASSIUM CHLORIDE, AND CALCIUM CHLORIDE 125 ML/HR: .6; .31; .03; .02 INJECTION, SOLUTION INTRAVENOUS at 18:27

## 2024-10-02 RX ADMIN — ACETAMINOPHEN 325MG 650 MG: 325 TABLET ORAL at 11:03

## 2024-10-02 RX ADMIN — POTASSIUM CHLORIDE 20 MEQ: 14.9 INJECTION, SOLUTION INTRAVENOUS at 21:20

## 2024-10-02 RX ADMIN — SODIUM CHLORIDE, SODIUM LACTATE, POTASSIUM CHLORIDE, AND CALCIUM CHLORIDE 125 ML/HR: .6; .31; .03; .02 INJECTION, SOLUTION INTRAVENOUS at 02:28

## 2024-10-02 RX ADMIN — MAGNESIUM SULFATE HEPTAHYDRATE 2 G: 40 INJECTION, SOLUTION INTRAVENOUS at 21:20

## 2024-10-02 RX ADMIN — Medication: at 08:24

## 2024-10-02 RX ADMIN — CEFTRIAXONE 1000 MG: 2 INJECTION, POWDER, FOR SOLUTION INTRAMUSCULAR; INTRAVENOUS at 20:26

## 2024-10-02 RX ADMIN — METRONIDAZOLE 500 MG: 500 TABLET ORAL at 08:21

## 2024-10-02 RX ADMIN — CEPHALEXIN 500 MG: 250 CAPSULE ORAL at 08:21

## 2024-10-02 RX ADMIN — DOCUSATE SODIUM 100 MG: 100 CAPSULE, LIQUID FILLED ORAL at 08:20

## 2024-10-02 RX ADMIN — ACETAMINOPHEN 325MG 650 MG: 325 TABLET ORAL at 18:23

## 2024-10-02 RX ADMIN — MAGNESIUM SULFATE HEPTAHYDRATE 2 G: 40 INJECTION, SOLUTION INTRAVENOUS at 09:07

## 2024-10-02 RX ADMIN — ACETAMINOPHEN 325MG 650 MG: 325 TABLET ORAL at 05:01

## 2024-10-02 RX ADMIN — METRONIDAZOLE 500 MG: 500 TABLET ORAL at 20:48

## 2024-10-02 RX ADMIN — DOCUSATE SODIUM 100 MG: 100 CAPSULE, LIQUID FILLED ORAL at 18:24

## 2024-10-02 RX ADMIN — OXYCODONE HYDROCHLORIDE 10 MG: 10 TABLET ORAL at 16:52

## 2024-10-02 RX ADMIN — FUROSEMIDE 20 MG: 10 INJECTION, SOLUTION INTRAMUSCULAR; INTRAVENOUS at 16:52

## 2024-10-02 NOTE — ASSESSMENT & PLAN NOTE
34-year-old G2, P2 postop day 0 from  delivery followed by return to the OR for retroperitoneal and broad ligament hematoma and hemodynamic instability now status post exploratory laparotomy, supracervical  hysterectomy, ligation of the left internal iliac artery and cystoscopy.  She is status post 6U PRBC, 3U FFP, 1 pack platelet  HD stable, remains intermittently tachycardic  Continue to trend labs and transfuse as needed.  Continue close monitoring of I/Os  Continue to monitor VS

## 2024-10-02 NOTE — QUICK NOTE
Patient see and examined case and note reviewed.  Patient report some incisional pain with movement.  Distension and edema noted.  Concern regarding persistent tachycardia.  Will continue to watch hgb trend.  Ice to incision.  Continue close monitoring.

## 2024-10-02 NOTE — PROGRESS NOTES
Progress Note - Critical Care/ICU   Name: Buffy Pope 34 y.o. female I MRN: [id number]  Unit/Bed#: ICU 14 I Date of Admission: 10/1/2024   Date of Service: 10/2/2024 I Hospital Day: 1      Assessment & Plan  Status post abdominal supracervical hysterectomy  34-year-old G2, P2 postop day 0 from  delivery followed by return to the OR for retroperitoneal and broad ligament hematoma and hemodynamic instability now status post exploratory laparotomy, supracervical  hysterectomy, ligation of the left internal iliac artery and cystoscopy.  She is status post 6U PRBC, 3U FFP, 1 pack platelet  HD stable, remains intermittently tachycardic  Continue to trend labs and transfuse as needed.  Continue close monitoring of I/Os  Continue to monitor VS    Acute blood loss anemia  2/2 emergent , retroperitoneal and broad ligament hematoma. Now status post exploratory laparotomy, supracervical  hysterectomy, ligation of the left internal iliac artery and cystoscopy.  She is status post 6U PRBC, 3U FFP, 1 pack platelet.  Continue to trend H/H every 6 hours.  Status post primary low transverse  section  Routine postpartum care and reviewed with nursing to offer breast pump and reviewed expression if desired while in ICU.  Post-operative pain  S/p emergent c-cestion, s/p ex-lap with supracervical  hysterectomy.  Continue Dilaudid PCA  Basal - 0  Bolus 0.3mg  LO - 10 minutes  Hourly max - 2.2mg  Transition to oral analgesia when able  Bowel regimen  Encourage IS  Disposition: Stepdown Level 1    ICU Core Measures     A: Assess, Prevent, and Manage Pain Has pain been assessed? Yes  Need for changes to pain regimen? Yes   B: Both SAT/SAT  N/A   C: Choice of Sedation RASS Goal: N/A patient not on sedation  Need for changes to sedation or analgesia regimen? NA   D: Delirium CAM-ICU: Negative   E: Early Mobility  Plan for early mobility? Yes   F: Family Engagement Plan for family engagement  today? Yes       Antibiotic Review: Post op requirements     Review of Invasive Devices:    Ken Plan: Continue for accurate I/O monitoring for 48 hours    Ivonne Plan: Keep arterial line for hemodynamic monitoring    Prophylaxis:  VTE Contraindicated secondary to: hemorrhage   Stress Ulcer  not ordered         24 Hour Events : Intermittently tachycardic overnight. Received 1 unit PRBCs for hgb 6.3, repeat hgb 9.2.  Subjective   Review of Systems: Review of Systems   Constitutional: Negative.  Negative for activity change, chills, diaphoresis and fatigue.   HENT: Negative.     Eyes: Negative.    Respiratory: Negative.     Cardiovascular:  Negative for chest pain and palpitations.   Gastrointestinal:  Positive for abdominal pain. Negative for abdominal distention, constipation, diarrhea, nausea and vomiting.   Endocrine: Negative.    Genitourinary: Negative.    Musculoskeletal: Negative.    Skin: Negative.    Neurological:  Negative for dizziness, syncope, weakness, light-headedness and numbness.   Hematological: Negative.    Psychiatric/Behavioral: Negative.       Objective :                   Vitals I/O      Most Recent Min/Max in 24hrs   Temp 100.1 °F (37.8 °C) Temp  Min: 97.5 °F (36.4 °C)  Max: 100.1 °F (37.8 °C)   Pulse (!) 130 Pulse  Min: 86  Max: 149   Resp (!) 11 Resp  Min: 11  Max: 26   /58 BP  Min: 91/60  Max: 140/81   O2 Sat 91 % SpO2  Min: 91 %  Max: 100 %      Intake/Output Summary (Last 24 hours) at 10/2/2024 0804  Last data filed at 10/2/2024 0700  Gross per 24 hour   Intake 8208.75 ml   Output 2175 ml   Net 6033.75 ml       Diet NPO; Sips with meds    Invasive Monitoring   Arterial Line  Ivonne /63  Arterial Line BP  Min: 85/61  Max: 139/95   MAP 79 mmHg  Arterial Line MAP (mmHg)  Min: 69 mmHg  Max: 109 mmHg           Physical Exam   Physical Exam  Vitals and nursing note reviewed.   Eyes:      Pupils: Pupils are equal, round, and reactive to light.   Skin:     General: Skin is warm and  dry.      Capillary Refill: Capillary refill takes less than 2 seconds.   HENT:      Head: Normocephalic and atraumatic.      Mouth/Throat:      Mouth: Mucous membranes are moist.   Cardiovascular:      Rate and Rhythm: Regular rhythm. Tachycardia present.      Pulses: Normal pulses.           Radial pulses are 2+ on the right side and 2+ on the left side.        Dorsalis pedis pulses are 2+ on the right side and 2+ on the left side.      Heart sounds: Normal heart sounds.   Musculoskeletal:         General: Normal range of motion.      Cervical back: Full passive range of motion without pain, normal range of motion and neck supple.      Right lower leg: No edema.      Left lower leg: No edema.   Abdominal: General: Bowel sounds are normal. There is no distension.      Palpations: Abdomen is soft.      Tenderness: There is no abdominal tenderness.      Comments: Midline incision    Constitutional:       General: She is awake. She is not in acute distress.     Appearance: She is well-developed, overweight and well-nourished. She is not ill-appearing or toxic-appearing.   Pulmonary:      Effort: Pulmonary effort is normal.      Breath sounds: Normal breath sounds.   Psychiatric:         Attention and Perception: Attention and perception normal.         Mood and Affect: Mood normal.         Behavior: Behavior is cooperative.         Thought Content: Thought content normal.         Cognition and Memory: Cognition normal.         Judgment: Judgment normal.   Neurological:      General: No focal deficit present.      Mental Status: She is alert, oriented to person, place, and time and oriented to person, place and time. Mental status is at baseline. She is calm.      GCS: GCS eye subscore is 4. GCS verbal subscore is 5. GCS motor subscore is 6.      Sensory: Sensation is intact.      Motor: Strength full and intact in all extremities.   Genitourinary/Anorectal:     Comments: Rogers catheter  Rogers present.         Diagnostic Studies        Lab Results: I have reviewed the following results:     Medications:  Scheduled PRN   acetaminophen, 650 mg, Q6H JOSIAH  cephalexin, 500 mg, Q12H JOSIAH  docusate sodium, 100 mg, BID  metroNIDAZOLE, 500 mg, Q12H JOSIAH      calcium carbonate, 1,000 mg, Daily PRN  diphenhydrAMINE, 25 mg, Q6H PRN  ondansetron, 4 mg, Q8H PRN  oxyCODONE, 10 mg, Q4H PRN  oxyCODONE, 5 mg, Q4H PRN  simethicone, 80 mg, 4x Daily PRN       Continuous    HYDROmorphone,   lactated ringers, 125 mL/hr, Last Rate: 125 mL/hr (10/02/24 0228)         Labs:   CBC    Recent Labs     10/01/24  1445 10/01/24  1709 10/01/24  1744 10/01/24  2104 10/02/24  0015 10/02/24  0507   WBC 12.02*  --  10.26*  --  9.20 9.38   HGB 10.5*   < > 9.2*   < > 8.1* 7.7*   HCT 29.8*  --  26.2*   < > 23.5* 22.1*   *  --  111*  --   --  108*   BANDSPCT 3  --   --   --   --   --     < > = values in this interval not displayed.     BMP    Recent Labs     10/01/24  1445 10/02/24  0507   SODIUM 136 137   K 4.3 4.1   * 110*   CO2 20* 23   AGAP 6 4   BUN 9 11   CREATININE 0.51* 0.62   CALCIUM 8.4 7.7*       Coags    Recent Labs     10/01/24  0714 10/01/24  1445 10/02/24  0015 10/02/24  0507   INR 1.08   < > 0.97 0.98   PTT 26  --   --   --     < > = values in this interval not displayed.        Additional Electrolytes  Recent Labs     10/01/24  1445 10/01/24  1804 10/02/24  0015 10/02/24  0507   MG 1.7*  --   --  1.7*   PHOS 3.2  --   --  3.2   CAIONIZED 1.20   < > 1.12 1.11*    < > = values in this interval not displayed.          Blood Gas    Recent Labs     10/01/24  1511   PHART 7.395   FIO6DPR 32.7*   PO2ART 99.5   IZA3BUK 19.6*   BEART -4.5   SOURCE Line, Arterial     Recent Labs     10/01/24  1511   SOURCE Line, Arterial    LFTs  Recent Labs     10/01/24  1445 10/02/24  0507   ALT 10 8   AST 21 15   ALKPHOS 89 75   ALB 2.7* 2.3*   TBILI 1.05* 0.44       Infectious  No recent results  Glucose  Recent Labs     10/01/24  0200 10/01/24  1445  10/02/24  0507   GLUC 88 [phone]

## 2024-10-02 NOTE — ASSESSMENT & PLAN NOTE
Total QBL 1703 mL   Secondary to Severe Uterine atony s/p methergine, hemabate, rectal cytotec, TXA, and Allyson intrauterine device  as well as expanding left retroperitoneal hematoma,

## 2024-10-02 NOTE — CASE MANAGEMENT
Case Management Progress Note    Patient name Buffy Pope  Location ICU 14/ICU 14 MRN [id number]  : 1990 Date 10/2/2024       LOS (days): 1  Geometric Mean LOS (GMLOS) (days):   Days to GMLOS:        OBJECTIVE:        Current admission status: Inpatient  Preferred Pharmacy:   [facility]/pharmacy #[id number] - [facility] [address] [address]  Phone: [phone] Fax: [phone]    Primary Care Provider: No primary care provider on file.    Primary Insurance:   Secondary Insurance:     PROGRESS NOTE:    CM referred patient and baby to [facility] for insurance assistance.  CM will follow up with [facility] regarding baby supplies and resources.

## 2024-10-02 NOTE — ASSESSMENT & PLAN NOTE
S/p 6U PRBC, 3U FFP, 1 pack platelet   Continue to trend  Hgb / Hct       Results from last 7 days   Lab Units 10/02/24  0507 10/02/24  0015 10/01/24  2104 10/01/24  1744 10/01/24  1709 10/01/24  1445 10/01/24  0933 10/01/24  0822   HEMOGLOBIN g/dL 7.7* 8.1* 9.0* 9.2* 6.3* 10.5*  --   --    I STAT HEMOGLOBIN g/dl  --   --   --   --   --   --  8.2* 8.5*   HEMATOCRIT % 22.1* 23.5* 26.1* 26.2*  --  29.8*  --   --    HEMATOCRIT, ISTAT %  --   --   --   --   --   --  24* 25*

## 2024-10-02 NOTE — UTILIZATION REVIEW
Initial Clinical Review    Admission: Date/Time/Statement:   Admission Orders (From admission, onward)       Ordered        10/01/24 0253  Inpatient Admission  Once                          Orders Placed This Encounter   Procedures    Inpatient Admission     Standing Status:   Standing     Number of Occurrences:   1     Order Specific Question:   Level of Care     Answer:   Med Surg [16]     Order Specific Question:   Estimated length of stay     Answer:   More than 2 Midnights     Order Specific Question:   Certification     Answer:   I certify that inpatient services are medically necessary for this patient for a duration of greater than two midnights. See H&P and MD Progress Notes for additional information about the patient's course of treatment.     Arrival Information       Patient transfer to [facility] from [facility] as higher level of care due to need OB                        chief complaint:  pregnancy      Initial Presentation: 34 y.o. female  to [facility] via EMS from  [facility] ED .    Admitted to inpatient with Dx:  41 week 4 day pregnancy/  Malpresentation/No prenatal care .  Presented to ED with pregnancy,  water broke 15 minutes prior to arrival, + contractions until 8 minutes ago then decreased.   No pre  care due to previous traumatic experience, no insurance.   In ED, FHT fluctuate 130 - 180.  Sterile vaginal exam station is -2. Feel cord presenting. Feels like some about 5cm dilation. No idea effacement. H&H 9.5/30.5.  Decision to transfer to [place]. Given ampicillin and azithro, IVF.  Continuous upward pressure off cord.   On re exam,  ED Doc feels two hands, station about 0, fully dilated. Occasionally feel pulsation hard to tell if cord.   ED Doc  to accompany en route - en route FHT w/ variation between 130 and 210. Mostly around 150. station to +2, two hands grabbing me (Doc) and arms. Occasional pulsation.   At [facility] to OR. Prenatal labs ordered.     PMHx:appendectomy.   1 vaginal delivery.     Procedure 10/1/24:   Section   Findings:  Delivery of viable female on 10/01/24 at 41w4d, weight 10lbs 14oz;  Apgar scores of 3 at one minute and 9 at five minutes.   2. Normal appearing placenta with centrally-inserted 3 vessel cord  3. Meconium stained amniotic fluid  4. Left paratubal cyst     10/1/24 Post procedure symptomatic hypotension.  She was noted to have a BP of of 80s/50s. She is lightheaded and dizzy without tachycardia (BP 70s). Anesthesia present at the bedside and gave albumin. Bimanual exam notable for a firm uterus with fundus deviated to the right and 3cm above the umbilicus.  Transfused 2 units of PRBC in PACU.  Ct with left 15 cm hematoma extending into the parametrium with active bleeding from the left internal iliac.  Ordered 4 units of PRBC, 4 units FFP.  Return to OR    Procedure 10/1/24 LAPAROTOMY EXPLORATORY. SUPRACERVICAL HYSTERECTOMY. BILATERAL SALPINGECTOMY. LEFT HYPOGASTRIC ARTERY LIGATION. CYSTOSCOPY   Estimated Blood Loss:   2L including clot evacuation  Received 4U PRBCs, 3U FFP, 1U platelets intraoperatively  Operative Findings:  - Large amount of organized clot in the left retroperitoneum extending from the left bladder flap to above the pelvic brim with ongoing active bleeding from the internal iliac artery. Left retroperitoneum explored with good visualization of left common iliac, external iliac vessels, internal iliac vessels, and ureter  - Left hypogastric artery ligation completed with subsequent control of active hemorrhage  - Normal appearing bilateral ovaries and fallopian tubes with small 3cm left sided paratubal cyst   - Cystoscopy: bubble at dome of bladder with no evidence of bladder injury, bilateral ureteral orifices visualized with brisk ureteral jets bilaterally   - Boggy 18week uterus without good uterine tone after administration of methergine, hemabate, rectal cytotec, TXA, and Allyson intrauterine device  requiring hysterectomy  - Bilateral ureters identified vermiculating during hysterectomy   - At completion of hysterectomy, SurgiFlo placed with adequate hemostasis    10/1/24 Post procedure #2 to ICU  Per Critical Care 10/1/24 -  Full-Term Labor w/Fetal Malpresentation s/p Emergent  10/01.   c/b post-op hemorrhagic shock 2/2 retroperitoneal and broad ligament hematoma s/p exploratory laparotomy, supracervical  hysterectomy, ligation of the left internal iliac artery and cystoscopy. S/p 6U PRBC, 3U FFP, 1 pack platelet .   Plan is Multimodal analgesia, including Dilaudid PCA, Delirium precautions.  Monitor H&H.  Cardiac Monitor. MAP goal > 65. Arterial line.   Pulse Oximetry. Incentive Spirometry. O2 Sat goal > 92%.  Stress ulcer px.  Bowel regimen.  NPO.  IVF. Correct electrolytes as needed.  SCDs.  Ivonne Rogers.  Continue IVF      Date: 10/2/24    Day 2: POD#1 stat 1LTCS for fetal malpresentation at 41w complicated by retroperitoneal hemorrhage requiring take back for  ex lap, supracervical hysterectomy, left hypogastric artery ligation and  hysterectomy.   Overnight diffuse abdominal pain.  Intermittently tachycardic Received 1 unit PRBCs for hgb 6.3, repeat hgb 9.2.  today continued abdominal pain.   No flatus.  No BM.  H&H 7.7/22.1.  on exam:  tearful.  Tachycardia.   Abdominal distention.   Abdominal tenderness.  Inverted T incision with surrounding erythema.   Incision dry and intact.  Rogers in place.  Continue to trend H&H.  Ice to incision.   Surgical site infection prophylaxis with Keflex and Flagyl PO x 48 hours.   DVT PPX: SCDs .  Pain control and Dilaudid PCA increased    Scheduled Medications:  acetaminophen, 650 mg, Oral, Q6H JOSIAH  cephalexin, 500 mg, Oral, Q12H JOSIAH  docusate sodium, 100 mg, Oral, BID  magnesium sulfate, 2 g, Intravenous, Once 0907 10/2/24  metroNIDAZOLE, 500 mg, Oral, Q12H JOSIAH    magnesium sulfate 2 g/50 mL IVPB (premix) 2 g  Dose: 2 g  Freq: Once  Route: IV  Last Dose: Stopped (10/01/24 1748)  Start: 10/01/24 1600 End: 10/01/24 1748  oxytocin (PITOCIN) 30 Units in lactated ringers 500 mL infusion  Dose: 62.5 jazmin-units/min  Freq: Once Route: IV  Last Dose: 62.5 jazmin-units/min (10/01/24 0515)  Start: 10/01/24 0545 End: 10/01/24 0515      Continuous IV Infusions:  HYDROmorphone, , Intravenous, Continuous  lactated ringers, 125 mL/hr, Intravenous, Continuous    HYDROmorphone (DILAUDID) 1 mg/mL 50 mL PCA  Continuous Rate: 0 mg/hr  PCA Dose: 0.2 mg  PCA Lock-out: 10 Minutes  One Hour Limit: 2.2 mg  Freq: Continuous Route: IV  Last Dose: Stopped (10/02/24 0824)  Start: 10/01/24 1900 End: 10/02/24 0713    PRN Meds:  calcium carbonate, 1,000 mg, Oral, Daily PRN  diphenhydrAMINE, 25 mg, Oral, Q6H PRN  ondansetron, 4 mg, Intravenous, Q8H PRN  oxyCODONE, 10 mg, Oral, Q4H PRN  oxyCODONE, 5 mg, Oral, Q4H PRN  simethicone, 80 mg, Oral, 4x Daily PRN    HYDROmorphone (DILAUDID) injection 0.5 mg - given 0625 on 10/1  Dose: 0.5 mg  Freq: Every 2 hour PRN Route: IV  PRN Reason: breakthrough pain  Start: 10/01/24 0344 End: 10/02/24 0343    miSOPROStol (Cytotec) tablet - given 0916 10/1/24  Freq: As needed  Start: 10/01/24 0916 End: 10/01/24 1152    ED Triage Vitals   Temperature Pulse Respirations Blood Pressure SpO2 Pain Score   10/01/24 0430 10/01/24 0428 10/01/24 0430 10/01/24 0428 10/01/24 0430 10/01/24 0430   97.5 °F (36.4 °C) 74 20 90/54 98 % No Pain     Weight (last 2 days)       Date/Time    10/01/24 0633    Comment rows:    OBSERV: return from cat scan at 10/01/24 0633    10/01/24 0610    Comment rows:    OBSERV: to catscan at 10/01/24 0610    10/01/24 0450    Comment rows:    OBSERV: labs sent at 10/01/24 0450            Vital Signs (last 3 days)       Date/Time Temp Pulse Resp BP MAP (mmHg) Arterial Line BP MAP SpO2 O2 Flow Rate (L/min) O2 Device Cardiac (WDL) Patient Position - Orthostatic VS Dawit Coma Scale Score Pain    10/02/24 0900 -- 129 13 113/58 77 110/63  78 mmHg 92 % -- -- -- -- -- --    10/02/24 0800 -- 128 10 114/59 78 109/60 75 mmHg 91 % -- -- -- -- 15 8    10/02/24 0700 100.1 °F (37.8 °C) 128 11 119/57 81 105/58 74 mmHg 92 % -- -- -- -- -- --    10/02/24 0600 -- 130 11 116/58 83 112/63 79 mmHg 91 % -- -- -- -- -- --    10/02/24 0501 -- -- -- -- -- -- -- -- -- -- -- -- -- 7    10/02/24 0500 -- 129 11 120/58 83 109/61 76 mmHg 92 % -- -- -- -- -- --    10/02/24 0400 98.8 °F (37.1 °C) 130 11 116/56 81 106/60 74 mmHg 93 % -- -- -- Lying 15 --    10/02/24 0300 -- 130 12 117/57 82 104/58 74 mmHg 93 % -- -- -- -- -- --    10/02/24 0245 -- 133 12 116/56 80 104/58 73 mmHg 92 % -- -- -- -- -- --    10/02/24 0230 -- 133 12 117/56 81 102/56 72 mmHg 93 % -- -- -- -- -- --    10/02/24 0215 -- 130 12 111/56 80 102/57 72 mmHg 93 % -- -- -- -- -- --    10/02/24 0200 -- 133 12 116/56 81 104/58 73 mmHg 92 % -- -- -- -- -- --    10/02/24 0145 -- 133 12 112/56 79 102/58 73 mmHg 92 % -- -- -- -- -- --    10/02/24 0100 -- 134 12 113/55 77 100/58 72 mmHg 92 % -- -- -- -- -- --    10/02/24 0000 -- 135 12 116/58 82 101/59 73 mmHg 94 % -- -- -- -- 15 --    10/01/24 2330 -- 135 12 108/56 77 100/58 72 mmHg 93 % -- -- -- -- -- --    10/01/24 2300 -- 144 18 112/55 77 98/59 73 mmHg 94 % -- -- -- -- -- --    10/01/24 2223 98.6 °F (37 °C) 135 12 108/55 76 97/56 70 mmHg 93 % -- -- -- Lying -- --    10/01/24 2145 -- 130 12 104/56 77 96/56 70 mmHg 93 % -- -- -- -- -- --    10/01/24 2000 -- -- -- -- -- -- -- -- -- -- -- -- 15 4    10/01/24 1900 98.8 °F (37.1 °C) 123 14 112/55 76 97/61 75 mmHg 95 % -- -- -- -- -- --    10/01/24 1846 -- 127 17 108/61 -- 94/62 75 mmHg 95 % -- -- -- -- -- --    10/01/24 1845 -- 129 17 108/61 -- 93/62 74 mmHg 95 % -- -- -- -- -- --    10/01/24 1844 99.2 °F (37.3 °C) 129 18 91/60 -- -- -- -- -- -- -- -- -- --    10/01/24 1842 -- -- -- -- -- -- -- -- -- -- -- -- -- 8    10/01/24 1838 -- 131 19 -- -- 102/67 80 mmHg 95 % -- -- -- -- -- --    10/01/24 1830 -- 136 21 112/61 80  101/66 79 mmHg 95 % -- -- -- -- -- --    10/01/24 1827 -- 142 18 -- -- 97/65 78 mmHg 95 % -- -- -- -- -- --    10/01/24 1823 99.6 °F (37.6 °C) 149 20 94/63 -- 92/65 76 mmHg 96 % -- -- -- -- -- --    10/01/24 1815 -- 136 14 109/53 -- 90/65 75 mmHg 95 % -- -- -- -- -- --    10/01/24 1800 -- 140 26 109/64 81 86/63 73 mmHg 95 % -- -- -- -- -- --    10/01/24 1745 -- 138 13 105/63 79 91/63 75 mmHg 94 % -- -- -- -- -- --    10/01/24 1730 -- 133 15 101/56 -- 86/58 69 mmHg 94 % -- -- -- -- -- --    10/01/24 1700 -- 142 21 106/59 77 85/61 72 mmHg 95 % -- -- -- -- -- --    10/01/24 1630 -- 130 12 107/63 81 85/60 69 mmHg 96 % -- -- -- -- -- --    10/01/24 1600 -- 136 14 111/72 87 95/66 77 mmHg 95 % -- -- -- -- 15 --    10/01/24 1500 -- 129 12 112/75 91 103/72 83 mmHg 96 % -- -- -- -- -- --    10/01/24 1430 -- 86 24 127/61 88 120/91 101 mmHg 96 % -- -- -- -- -- --    10/01/24 1400 -- 86 12 140/81 105 129/95 107 mmHg 98 % -- -- -- -- -- --    10/01/24 1331 -- 119 16 139/89 106 139/95 109 mmHg -- -- -- -- Lying -- --    10/01/24 1330 -- -- -- -- -- -- -- -- -- -- -- -- 15 3    10/01/24 1315 97.5 °F (36.4 °C) 116 16 123/62 -- 132/90 106 mmHg 99 % -- None (Room air) WDL -- -- No Pain    10/01/24 1301 -- 108 16 124/63 88 132/90 106 mmHg 99 % -- None (Room air) -- -- -- 8    10/01/24 1300 97.6 °F (36.4 °C) 104 16 124/63 88 136/92 108 mmHg 99 % -- None (Room air) WDL -- -- 8    10/01/24 1245 97.6 °F (36.4 °C) 100 16 120/71 92 134/90 106 mmHg 100 % -- None (Room air) WDL -- -- No Pain    10/01/24 1230 97.5 °F (36.4 °C) 100 16 131/63 91 134/88 108 mmHg 100 % -- None (Room air) WDL -- -- No Pain    10/01/24 1226 -- 102 16 -- -- 136/90 108 mmHg 100 % -- -- -- -- -- 8    10/01/24 1215 97.8 °F (36.6 °C) 96 16 124/80 97 133/88 97 mmHg 100 % 6 L/min Simple mask WDL -- -- No Pain    10/01/24 1200 97.8 °F (36.6 °C) 111 16 117/57 82 121/80 95 mmHg 100 % 6 L/min Simple mask WDL -- -- No Pain    10/01/24 1148 98.2 °F (36.8 °C) 104 16 103/67 81  112/78 98 mmHg 100 % 6 L/min Simple mask WDL -- -- No Pain    10/01/24 0731 96.6 °F (35.9 °C) 123 21 96/59 -- -- -- 96 % -- None (Room air) -- -- -- --    10/01/24 0720 -- -- -- 100/58 -- -- -- -- -- -- -- -- -- --    10/01/24 0716 -- 136 -- -- -- -- -- 96 % -- -- -- -- -- --    10/01/24 0715 -- 135 -- -- -- -- -- 93 % -- -- -- -- -- --    10/01/24 0714 -- 138 -- -- -- -- -- 96 % -- -- -- -- -- --    10/01/24 0713 -- 139 -- -- -- -- -- 93 % -- -- -- -- -- --    10/01/24 0712 -- 135 -- -- -- -- -- 92 % -- -- -- -- -- --    10/01/24 0711 -- 137 -- -- -- -- -- 93 % -- -- -- -- -- --    10/01/24 0710 -- 141 -- -- -- -- -- 92 % -- -- -- -- -- --    10/01/24 0709 96.3 °F (35.7 °C) 138 16 100/58 -- -- -- 93 % -- -- -- -- -- --    10/01/24 0708 -- 140 -- -- -- -- -- 92 % -- -- -- -- -- --    10/01/24 0707 -- 143 -- -- -- -- -- 95 % -- -- -- -- -- --    10/01/24 0706 96.3 °F (35.7 °C) 147 16 94/53 -- -- -- 96 % -- -- -- -- -- --    10/01/24 0705 -- 148 -- -- -- -- -- 96 % -- -- -- -- -- --    10/01/24 0704 -- 146 -- -- -- -- -- 97 % -- -- -- -- -- --    10/01/24 0703 -- 149 -- -- -- -- -- 94 % -- -- -- -- -- --    10/01/24 0702 -- 152 -- -- -- -- -- 94 % -- -- -- -- -- --    10/01/24 0701 -- 148 -- -- -- -- -- 96 % -- -- -- -- -- --    10/01/24 0700 -- 146 -- 88/60 69 -- -- 96 % -- -- -- -- -- 9    10/01/24 0655 -- 139 -- 79/49 59 -- -- -- -- -- -- -- -- --    10/01/24 0650 -- 140 -- 74/41 53 -- -- -- -- -- -- -- -- --    10/01/24 0647 -- -- -- 67/36 -- -- -- -- -- -- -- -- -- --    10/01/24 0646 -- 133 -- 65/32 -- -- -- -- -- -- -- -- -- --    10/01/24 0645 -- 130 -- 71/36 -- -- -- -- -- -- -- -- -- --    10/01/24 0644 -- 128 -- -- -- -- -- -- -- -- -- -- -- --    10/01/24 0643 -- 128 -- -- -- -- -- -- -- -- -- -- -- --    10/01/24 0642 97.6 °F (36.4 °C) -- -- -- -- -- -- -- -- -- -- -- -- --    10/01/24 0638 -- -- -- -- -- -- -- -- -- -- -- -- -- 9    10/01/24 0633 -- -- -- -- -- -- -- -- -- -- -- -- -- --    OBSERV:  return from cat scan at 10/01/24 0633    10/01/24 0610 -- -- -- -- -- -- -- -- -- -- -- -- -- --    OBSERV: to catscan at 10/01/24 0610    10/01/24 0606 -- 138 -- 107/60 -- -- -- 99 % -- -- -- -- -- --    10/01/24 0605 -- 135 -- 104/61 -- -- -- 100 % -- -- -- -- -- --    10/01/24 0604 97.4 °F (36.3 °C) 135 -- 104/61 -- -- -- -- -- -- -- -- -- --    10/01/24 0603 97.4 °F (36.3 °C) -- -- -- -- -- -- 100 % -- -- -- -- -- --    10/01/24 0601 -- 136 -- -- -- -- -- 100 % -- -- -- -- -- --    10/01/24 0600 -- 133 -- 106/62 -- -- -- 100 % -- Simple mask -- -- -- 6    10/01/24 0558 -- 130 -- 99/62 74 -- -- -- -- -- -- -- -- 6    10/01/24 0551 -- 126 14 102/61 76 -- -- -- -- -- -- -- -- --    10/01/24 0549 97.4 °F (36.3 °C) 123 -- 100/62 75 -- -- -- -- -- -- -- -- --    10/01/24 0542 -- 128 -- 99/56 -- -- -- 100 % -- -- -- -- -- 8    10/01/24 0541 -- 129 -- -- -- -- -- 100 % -- -- -- -- -- --    10/01/24 0540 -- 129 -- -- -- -- -- 100 % -- -- -- -- -- --    10/01/24 0539 -- 126 -- 100/55 -- -- -- 100 % -- -- -- -- -- --    10/01/24 0536 -- 124 -- 97/55 -- -- -- 100 % -- -- -- -- -- --    10/01/24 0533 -- 120 -- 104/60 -- -- -- 100 % -- -- -- -- -- --    10/01/24 0530 -- 119 -- 97/60 74 -- -- 100 % 2 L/min Simple mask -- -- -- --    10/01/24 0527 -- 120 -- 97/60 -- -- -- 100 % 2 L/min Simple mask -- -- -- --    10/01/24 0526 -- 120 -- 99/64 -- -- -- 100 % -- -- -- -- -- --    10/01/24 0521 -- 113 -- 96/62 -- -- -- 100 % -- -- -- -- -- --    10/01/24 0520 -- 111 -- 96/62 -- -- -- 100 % -- -- -- -- -- --    10/01/24 0518 -- 102 -- 101/60 -- -- -- 100 % -- -- -- -- -- --    10/01/24 0514 -- 123 -- 99/56 -- -- -- 100 % -- -- -- -- -- --    10/01/24 0509 -- 124 -- 102/55 -- -- -- 100 % -- -- -- -- -- --    10/01/24 0507 -- 108 -- 95/57 -- -- -- 100 % -- -- -- -- -- --    10/01/24 0506 -- 113 -- 98/58 -- -- -- 100 % -- -- -- -- -- --    10/01/24 0505 -- 115 -- -- -- -- -- 99 % -- -- -- -- -- --    10/01/24 0503 -- 115 -- 101/59 78  -- -- 99 % -- -- -- -- -- --    10/01/24 0500 -- -- -- 104/59 -- -- -- -- -- -- -- -- -- 4    10/01/24 0456 -- 80 -- 114/66 -- -- -- -- -- -- -- -- -- --    10/01/24 0455 -- -- -- -- -- -- -- 99 % 2 L/min Simple mask -- -- -- --    10/01/24 0453 -- 79 -- 85/53 -- -- -- -- -- -- -- -- -- --    10/01/24 0450 -- -- -- -- -- -- -- -- -- -- -- -- -- --    OBSERV: labs sent at 10/01/24 0450    10/01/24 0448 -- 88 -- 77/44 -- -- -- -- -- -- -- -- -- --    10/01/24 0444 -- 88 -- 91/52 -- -- -- -- -- -- -- -- -- --    10/01/24 0439 -- -- -- 109/67 -- -- -- 98 % -- -- -- -- -- --    10/01/24 0436 -- 68 -- 104/69 -- -- -- -- -- -- -- -- -- --    10/01/24 0430 97.5 °F (36.4 °C) 66 20 83/47 -- -- -- 98 % -- None (Room air) WDL -- -- No Pain    10/01/24 0428 -- 74 -- 90/54 -- -- -- -- -- -- -- -- -- --            Pertinent Labs/Diagnostic Test Results:   Radiology:  XR chest portable ICU   Final Interpretation by Yobany Way MD (10/02 0426)      No acute cardiopulmonary disease.            Workstation performed: [id number]         XR follow up   Final Interpretation by Lilly Gomez MD (10/01 1139)   There is no radiopaque metallic foreign body with appearance of suture or in the               Workstation performed: [id number]         CTA abdomen pelvis w wo contrast   Final Interpretation by Marc Winter MD (10/01 0655)      Large hematoma with active hemorrhage in the left parametrial space.      Other expected postsurgical changes in the abdomen and pelvis status post recent  section.         I personally discussed this study with Dr. Judy Montes on 10/1/2024 6:54 AM.               Workstation performed: [id number]           Results from last 7 days   Lab Units 10/02/24  0507 10/02/24  0015 10/01/24  2104 10/01/24  1744 10/01/24  1709 10/01/24  1445 10/01/24  0447 10/01/24  0200   WBC Thousand/uL 9.38 9.20  --  10.26*  --  12.02*   < > 9.22   HEMOGLOBIN g/dL 7.7* 8.1* 9.0* 9.2* 6.3* 10.5*   < >  9.5*   I STAT HEMOGLOBIN   --   --   --   --   --   --    < >  --    HEMATOCRIT % 22.1* 23.5* 26.1* 26.2*  --  29.8*   < > 30.5*   HEMATOCRIT, ISTAT   --   --   --   --   --   --    < >  --    PLATELETS Thousands/uL 108*  --   --  111*  --  104*   < > 187   TOTAL NEUT ABS Thousands/µL  --   --   --   --   --   --   --  5.16   BANDS PCT %  --   --   --   --   --  3  --   --     < > = values in this interval not displayed.     Results from last 7 days   Lab Units 10/02/24  0507 10/02/24  0015 10/01/24  1804 10/01/24  1445 10/01/24  0933 10/01/24  0822 10/01/24  0749 10/01/24  0749 10/01/24  0201 10/01/24  0200   SODIUM mmol/L 137  --   --  136  --   --   --   --   --  134*   POTASSIUM mmol/L 4.1  --   --  4.3  --   --   --   --   --  3.7   CHLORIDE mmol/L 110*  --   --  110*  --   --   --   --   --  105   CO2 mmol/L 23  --   --  20*  --   --   --   --   --  17*   CO2, I-STAT mmol/L  --   --   --   --  15* 16*  --   --   --   --    ANION GAP mmol/L 4  --   --  6  --   --   --   --   --  12   BUN mg/dL 11  --   --  9  --   --   --   --   --  10   CREATININE mg/dL 0.62  --   --  0.51*  --   --   --   --   --  0.59*   EGFR ml/min/1.73sq m 118  --   --  125  --   --   --   --   --  119   CALCIUM mg/dL 7.7*  --   --  8.4  --   --   --   --   --  8.6   CALCIUM, IONIZED mmol/L 1.11* 1.12 1.17 1.20  --   --   --   --   --   --    CALCIUM, IONIZED, ISTAT mmol/L  --   --   --   --  1.16 0.91*   < > 0.55*  --   --    MAGNESIUM mg/dL 1.7*  --   --  1.7*  --   --   --   --  1.5*  --    PHOSPHORUS mg/dL 3.2  --   --  3.2  --   --   --   --   --   --     < > = values in this interval not displayed.     Results from last 7 days   Lab Units 10/02/24  0507 10/01/24  1445 10/01/24  0200   AST U/L 15 21 15   ALT U/L 8 10 8   ALK PHOS U/L 75 89 188*   TOTAL PROTEIN g/dL 4.1* 4.7* 6.7   ALBUMIN g/dL 2.3* 2.7* 3.3*   TOTAL BILIRUBIN mg/dL 0.44 1.05* 0.43     Results from last 7 days   Lab Units 10/02/24  0507 10/01/24  1445 10/01/24  0200    GLUCOSE RANDOM mg/dL 100 117 88     Results from last 7 days   Lab Units 10/01/24  1511   PH ART  7.395   PCO2 ART mm Hg 32.7*   PO2 ART mm Hg 99.5   HCO3 ART mmol/L 19.6*   BASE EXC ART mmol/L -4.5   O2 CONTENT ART mL/dL 15.5*   O2 HGB, ARTERIAL % 97.3*   ABG SOURCE  Line, Arterial     Results from last 7 days   Lab Units 10/01/24  0933 10/01/24  0822 10/01/24  0749   I STAT BASE EXC mmol/L -12* -13*  --    I STAT O2 SAT % 100* 100*  --    ISTAT PH ART  7.244* 7.148* 7.122*   I STAT ART PCO2 mm HG 32.2* 42.6 <17.0*   I STAT ART PO2 mm .0* 232.0* 206.0*   I STAT ART HCO3 mmol/L 13.9* 14.8*  --      Results from last 7 days   Lab Units 10/02/24  0507 10/02/24  0015 10/01/24  1804 10/01/24  1445 10/01/24  0714   PROTIME seconds 13.7 13.6 14.6   < > 14.8   INR  0.98 0.97 1.07   < > 1.08   PTT seconds  --   --   --   --  26    < > = values in this interval not displayed.     Results from last 7 days   Lab Units 10/01/24  2104 10/01/24  1709 10/01/24  1445   LACTIC ACID mmol/L 1.7 1.9 2.6*     Results from last 7 days   Lab Units 10/02/24  0657 10/02/24  0547 10/01/24  1214 10/01/24  1212 10/01/24  1210   UNIT PRODUCT CODE  S2204M93 [id number]  I0417M63  C2725V01  I4843X41  C4073O63  I6299K18  J6450L42  Z4956K75  R2037W24  L4417H90  C0341A16  H1787Q20  J9475T46  X7896G53  M5537T89  C8850O78 A1424X56  B0128S31  O6758V43  H8358H67 H9029D13 Y0934Y16  G4767V38   UNIT NUMBER  [id number]  [id number]  [id number]  [id number]  [id number]  [id number]  [id number]  [id number]  [id number]  [id number]  [id number]  [id number]  [id number]  [id number]  [id number]  [id number]  [id number]  [id number]  [id number]  [id number]   UNITABO  A A  O  O  O  O  O  O  B  A  A  A  O  O  O  O  O O  O  O  O A A  AB   UNITRH  POS  POS  POS  POS  POS  POS  POS  POS  NEG  POS  POS  POS  POS  POS  POS  POS  POS POS  POS  POS  POS POS POS  POS   CROSSMATCH   --  Compatible  Compatible  Compatible  Compatible  Compatible  Compatible  Compatible  Compatible  Compatible  Compatible  Compatible Compatible  Compatible  Compatible  Compatible  --   --    UNIT DISPENSE STATUS  Return to Inv Presumed Trans  Crossmatched  Presumed Trans  Return to Inv  Presumed Trans  Presumed Trans  Return to Inv  Presumed Trans  Presumed Trans  Presumed Trans  Presumed Trans  Presumed Trans  Presumed Trans  Presumed Trans  Presumed Trans  Presumed Trans Return to Inv  Return to Inv  Return to Inv  Return to Inv Return to Inv Return to Inv  Return to Inv   UNIT PRODUCT VOL ml 280 280  350  350  350  300  300  300  300  250  280  280  350  300  350  350  300 350  350  350  350 300 280  250     Results from last 7 days   Lab Units 10/01/24  0447   HEP B S AG  Non-reactive   HEP C AB  Non-reactive       No past medical history on file.  Present on Admission:  **None**      Admitting Diagnosis: Prolapsed cord [O69.0XX0]  41 weeks gestation of pregnancy [O48.0, Z3A.41]  Pregnancy [Z34.90]  Encounter for  delivery without indication [O82]  Age/Sex: 34 y.o. female    Network Utilization Review Department  ATTENTION: Please call with any questions or concerns to [phone] and carefully listen to the prompts so that you are directed to the right person. All voicemails are confidential.   For Discharge needs, contact Care Management DC Support Team at [phone] opt. 2  Send all requests for admission clinical reviews, approved or denied determinations and any other requests to dedicated fax number below belonging to the campus where the patient is receiving treatment. List of dedicated fax numbers for the Facilities:  FACILITY NAME UR FAX NUMBER   ADMISSION DENIALS (Administrative/Medical  Necessity) [phone]   DISCHARGE SUPPORT TEAM (NETWORK) [phone]   PARENT CHILD HEALTH (Maternity/NICU/Pediatrics) [phone]   [facility] [phone]   [facility] [phone]   [facility] [phone]   [facility] [phone]   [facility] [phone]   [facility] [phone]   [facility] [phone]   [facility] [phone]   [facility] [phone]   [facility] [phone]   [facility] [phone]   [facility] [phone]

## 2024-10-02 NOTE — ASSESSMENT & PLAN NOTE
2/2 emergent , retroperitoneal and broad ligament hematoma. Now status post exploratory laparotomy, supracervical  hysterectomy, ligation of the left internal iliac artery and cystoscopy.  She is status post 6U PRBC, 3U FFP, 1 pack platelet.  Continue to trend H/H every 6 hours.

## 2024-10-02 NOTE — PROGRESS NOTES
Progress Note - GYN Oncology   Name: Buffy Pope 34 y.o. female I MRN: [id number]  Unit/Bed#: ICU 14 I Date of Admission: 10/1/2024   Date of Service: 10/2/2024 I Hospital Day: 1     34 y.o. POD# 1 s/p stat 1LTCS for fetal malpresentation at 41w complicated by acute blood loss anemia and retroperitoneal hemorrhage requiring take back for  ex lap, supracervical hysterectomy, left hypogastric artery ligation and  hysterectomy.   Assessment & Plan  Status post abdominal supracervical hysterectomy  POD#1 stat 1LTCS for fetal malpresentation at 41w complicated by retroperitoneal hemorrhage requiring take back for  ex lap, supracervical hysterectomy, left hypogastric artery ligation and  hysterectomy.   Management per ICU/OB primary   Rogers in place  Incision c/d/I  Surgical site infection prophylaxis with Keflex and Flagyl PO x 48 hours  DVT PPX: SCDs  Status post primary low transverse  section    Postpartum hemorrhage  Total QBL 1703 mL   Secondary to Severe Uterine atony s/p methergine, hemabate, rectal cytotec, TXA, and Allyson intrauterine device  as well as expanding left retroperitoneal hematoma,  Acute blood loss anemia  S/p 6U PRBC, 3U FFP, 1 pack platelet   Continue to trend  Hgb / Hct       Results from last 7 days   Lab Units 10/02/24  0507 10/02/24  0015 10/01/24  2104 10/01/24  1744 10/01/24  1709 10/01/24  1445 10/01/24  0933 10/01/24  0822   HEMOGLOBIN g/dL 7.7* 8.1* 9.0* 9.2* 6.3* 10.5*  --   --    I STAT HEMOGLOBIN g/dl  --   --   --   --   --   --  8.2* 8.5*   HEMATOCRIT % 22.1* 23.5* 26.1* 26.2*  --  29.8*  --   --    HEMATOCRIT, ISTAT %  --   --   --   --   --   --  24* 25*        For questions/concerns on this patient, please reach out to the following:  RA- GynOnc Resident        [id number] was used for this encounter    Subjective:    Buffy Pope has had diffuse abdominal pain overnight. No other current complaints.  Overnight events: none. Pain  is being managed with scheduled tylenol, van prn and dilaudid pca.  Patient  is currently voiding with talley in place.  She is not ambulating.  Patient is not currently passing flatus and has had no bowel movement. She currently NPO, and denies nausea or vomiting. Patient denies fever, chills, chest pain, shortness of breath, or calf tenderness.     Objective:  /58   Pulse (!) 130   Temp 98.8 °F (37.1 °C) (Oral)   Resp (!) 11   LMP 12/15/2023 (Approximate)   SpO2 91%   Breastfeeding Yes     I/O last 3 completed shifts:  In: 49015.7 [I.V.:8598.8; Blood:3621.9; IV Piggyback:180]  Out: 3628 [Urine:1925; Blood:1703]  No intake/output data recorded.    Lab Results   Component Value Date    WBC 9.38 10/02/2024    HGB 7.7 (L) 10/02/2024    HCT 22.1 (L) 10/02/2024    MCV 83 10/02/2024     (L) 10/02/2024       Lab Results   Component Value Date    GLUCOSE 163 (H) 10/01/2024    CALCIUM 7.7 (L) 10/02/2024    K 4.1 10/02/2024    CO2 23 10/02/2024     (H) 10/02/2024    BUN 11 10/02/2024    CREATININE 0.62 10/02/2024           Physical Exam  Vitals reviewed.   Constitutional:       General: She is not in acute distress.     Comments: tearful this morning   HENT:      Head: Normocephalic and atraumatic.   Cardiovascular:      Rate and Rhythm: Tachycardia present.   Pulmonary:      Effort: Pulmonary effort is normal. No respiratory distress.   Abdominal:      General: There is distension.      Tenderness: There is abdominal tenderness. There is no guarding or rebound.          Comments: Diffusely tender  Hypoactive bowel sounds  Inverted T incision noted to have surrounding erythema,  Incision dry and in tact   Musculoskeletal:      Right lower leg: No edema.      Left lower leg: No edema.      Comments: SCDs on and running   Neurological:      Mental Status: She is alert.   Psychiatric:         Mood and Affect: Mood normal.         Thought Content: Thought content normal.         Judgment: Judgment  normal.           Orestes Bai DO  10/2/2024  7:06 AM

## 2024-10-02 NOTE — ASSESSMENT & PLAN NOTE
S/p emergent c-cestion, s/p ex-lap with supracervical  hysterectomy.  Continue Dilaudid PCA  Basal - 0  Bolus 0.3mg  LO - 10 minutes  Hourly max - 2.2mg  Transition to oral analgesia when able  Bowel regimen  Encourage IS

## 2024-10-02 NOTE — ASSESSMENT & PLAN NOTE
POD#1 stat 1LTCS for fetal malpresentation at 41w complicated by retroperitoneal hemorrhage requiring take back for  ex lap, supracervical hysterectomy, left hypogastric artery ligation and  hysterectomy.   Management per ICU/OB primary   Rogers in place  Incision c/d/I  Surgical site infection prophylaxis with Keflex and Flagyl PO x 48 hours  DVT PPX: SCDs

## 2024-10-03 PROBLEM — R00.0 TACHYCARDIA: Status: ACTIVE | Noted: 2024-10-03

## 2024-10-03 LAB
ABO GROUP BLD BPU: NORMAL
ABO GROUP BLD BPU: NORMAL
ANION GAP SERPL CALCULATED.3IONS-SCNC: 6 MMOL/L (ref 4–13)
BPU ID: NORMAL
BPU ID: NORMAL
BUN SERPL-MCNC: 7 MG/DL (ref 5–25)
CA-I BLD-SCNC: 1.12 MMOL/L (ref 1.12–1.32)
CALCIUM SERPL-MCNC: 8 MG/DL (ref 8.4–10.2)
CHLORIDE SERPL-SCNC: 101 MMOL/L (ref 96–108)
CO2 SERPL-SCNC: 27 MMOL/L (ref 21–32)
CREAT SERPL-MCNC: 0.44 MG/DL (ref 0.6–1.3)
CROSSMATCH: NORMAL
CROSSMATCH: NORMAL
ERYTHROCYTE [DISTWIDTH] IN BLOOD BY AUTOMATED COUNT: 16.3 % (ref 11.6–15.1)
GFR SERPL CREATININE-BSD FRML MDRD: 132 ML/MIN/1.73SQ M
GLUCOSE SERPL-MCNC: 77 MG/DL (ref 65–140)
HCT VFR BLD AUTO: 27.5 % (ref 34.8–46.1)
HCT VFR BLD AUTO: 28.1 % (ref 34.8–46.1)
HCT VFR BLD AUTO: 32.4 % (ref 34.8–46.1)
HGB BLD-MCNC: 10.9 G/DL (ref 11.5–15.4)
HGB BLD-MCNC: 9.3 G/DL (ref 11.5–15.4)
HGB BLD-MCNC: 9.6 G/DL (ref 11.5–15.4)
MAGNESIUM SERPL-MCNC: 1.9 MG/DL (ref 1.9–2.7)
MCH RBC QN AUTO: 28.1 PG (ref 26.8–34.3)
MCHC RBC AUTO-ENTMCNC: 33.8 G/DL (ref 31.4–37.4)
MCV RBC AUTO: 83 FL (ref 82–98)
PHOSPHATE SERPL-MCNC: 3.3 MG/DL (ref 2.7–4.5)
PLATELET # BLD AUTO: 108 THOUSANDS/UL (ref 149–390)
PMV BLD AUTO: 10.6 FL (ref 8.9–12.7)
POTASSIUM SERPL-SCNC: 3.6 MMOL/L (ref 3.5–5.3)
RBC # BLD AUTO: 3.31 MILLION/UL (ref 3.81–5.12)
SODIUM SERPL-SCNC: 134 MMOL/L (ref 135–147)
T4 FREE SERPL-MCNC: 0.5 NG/DL (ref 0.61–1.12)
UNIT DISPENSE STATUS: NORMAL
UNIT DISPENSE STATUS: NORMAL
UNIT PRODUCT CODE: NORMAL
UNIT PRODUCT CODE: NORMAL
UNIT PRODUCT VOLUME: 300 ML
UNIT PRODUCT VOLUME: 350 ML
UNIT RH: NORMAL
UNIT RH: NORMAL
WBC # BLD AUTO: 12.37 THOUSAND/UL (ref 4.31–10.16)

## 2024-10-03 PROCEDURE — 99024 POSTOP FOLLOW-UP VISIT: CPT | Performed by: OBSTETRICS & GYNECOLOGY

## 2024-10-03 PROCEDURE — 99232 SBSQ HOSP IP/OBS MODERATE 35: CPT | Performed by: STUDENT IN AN ORGANIZED HEALTH CARE EDUCATION/TRAINING PROGRAM

## 2024-10-03 PROCEDURE — 99024 POSTOP FOLLOW-UP VISIT: CPT | Performed by: STUDENT IN AN ORGANIZED HEALTH CARE EDUCATION/TRAINING PROGRAM

## 2024-10-03 PROCEDURE — 85014 HEMATOCRIT: CPT

## 2024-10-03 PROCEDURE — 84100 ASSAY OF PHOSPHORUS: CPT

## 2024-10-03 PROCEDURE — 88307 TISSUE EXAM BY PATHOLOGIST: CPT | Performed by: PATHOLOGY

## 2024-10-03 PROCEDURE — 85027 COMPLETE CBC AUTOMATED: CPT

## 2024-10-03 PROCEDURE — 85018 HEMOGLOBIN: CPT

## 2024-10-03 PROCEDURE — 83735 ASSAY OF MAGNESIUM: CPT

## 2024-10-03 PROCEDURE — 82330 ASSAY OF CALCIUM: CPT

## 2024-10-03 PROCEDURE — 80048 BASIC METABOLIC PNL TOTAL CA: CPT

## 2024-10-03 PROCEDURE — 85018 HEMOGLOBIN: CPT | Performed by: STUDENT IN AN ORGANIZED HEALTH CARE EDUCATION/TRAINING PROGRAM

## 2024-10-03 PROCEDURE — 85014 HEMATOCRIT: CPT | Performed by: STUDENT IN AN ORGANIZED HEALTH CARE EDUCATION/TRAINING PROGRAM

## 2024-10-03 RX ORDER — KETOROLAC TROMETHAMINE 30 MG/ML
15 INJECTION, SOLUTION INTRAMUSCULAR; INTRAVENOUS EVERY 6 HOURS SCHEDULED
Status: DISCONTINUED | OUTPATIENT
Start: 2024-10-03 | End: 2024-10-03

## 2024-10-03 RX ORDER — FUROSEMIDE 10 MG/ML
20 INJECTION INTRAMUSCULAR; INTRAVENOUS ONCE
Status: COMPLETED | OUTPATIENT
Start: 2024-10-03 | End: 2024-10-03

## 2024-10-03 RX ORDER — ENOXAPARIN SODIUM 100 MG/ML
30 INJECTION SUBCUTANEOUS EVERY 12 HOURS SCHEDULED
Status: DISCONTINUED | OUTPATIENT
Start: 2024-10-03 | End: 2024-10-04

## 2024-10-03 RX ORDER — IBUPROFEN 600 MG/1
600 TABLET, FILM COATED ORAL EVERY 6 HOURS SCHEDULED
Status: DISCONTINUED | OUTPATIENT
Start: 2024-10-05 | End: 2024-10-03

## 2024-10-03 RX ORDER — ENOXAPARIN SODIUM 100 MG/ML
30 INJECTION SUBCUTANEOUS EVERY 12 HOURS SCHEDULED
Status: DISCONTINUED | OUTPATIENT
Start: 2024-10-03 | End: 2024-10-03

## 2024-10-03 RX ORDER — IBUPROFEN 600 MG/1
600 TABLET, FILM COATED ORAL EVERY 6 HOURS PRN
Status: DISCONTINUED | OUTPATIENT
Start: 2024-10-03 | End: 2024-10-04

## 2024-10-03 RX ORDER — MAGNESIUM SULFATE 1 G/100ML
1 INJECTION INTRAVENOUS ONCE
Status: COMPLETED | OUTPATIENT
Start: 2024-10-03 | End: 2024-10-03

## 2024-10-03 RX ORDER — POTASSIUM CHLORIDE 14.9 MG/ML
20 INJECTION INTRAVENOUS
Status: COMPLETED | OUTPATIENT
Start: 2024-10-03 | End: 2024-10-03

## 2024-10-03 RX ADMIN — IBUPROFEN 600 MG: 600 TABLET, FILM COATED ORAL at 23:15

## 2024-10-03 RX ADMIN — OXYCODONE HYDROCHLORIDE 10 MG: 10 TABLET ORAL at 20:48

## 2024-10-03 RX ADMIN — ACETAMINOPHEN 325MG 650 MG: 325 TABLET ORAL at 19:41

## 2024-10-03 RX ADMIN — ACETAMINOPHEN 325MG 650 MG: 325 TABLET ORAL at 01:03

## 2024-10-03 RX ADMIN — ENOXAPARIN SODIUM 30 MG: 30 INJECTION SUBCUTANEOUS at 11:11

## 2024-10-03 RX ADMIN — FUROSEMIDE 20 MG: 10 INJECTION, SOLUTION INTRAMUSCULAR; INTRAVENOUS at 01:03

## 2024-10-03 RX ADMIN — FUROSEMIDE 20 MG: 10 INJECTION, SOLUTION INTRAMUSCULAR; INTRAVENOUS at 18:12

## 2024-10-03 RX ADMIN — POTASSIUM CHLORIDE 20 MEQ: 14.9 INJECTION, SOLUTION INTRAVENOUS at 11:42

## 2024-10-03 RX ADMIN — ENOXAPARIN SODIUM 30 MG: 30 INJECTION SUBCUTANEOUS at 20:49

## 2024-10-03 RX ADMIN — ACETAMINOPHEN 325MG 650 MG: 325 TABLET ORAL at 11:10

## 2024-10-03 RX ADMIN — DOCUSATE SODIUM 100 MG: 100 CAPSULE, LIQUID FILLED ORAL at 18:07

## 2024-10-03 RX ADMIN — DOCUSATE SODIUM 100 MG: 100 CAPSULE, LIQUID FILLED ORAL at 09:32

## 2024-10-03 RX ADMIN — MAGNESIUM SULFATE HEPTAHYDRATE 1 G: 1 INJECTION, SOLUTION INTRAVENOUS at 09:32

## 2024-10-03 RX ADMIN — IBUPROFEN 600 MG: 600 TABLET, FILM COATED ORAL at 15:57

## 2024-10-03 RX ADMIN — ACETAMINOPHEN 325MG 650 MG: 325 TABLET ORAL at 06:17

## 2024-10-03 RX ADMIN — ACETAMINOPHEN 325MG 650 MG: 325 TABLET ORAL at 23:49

## 2024-10-03 RX ADMIN — OXYCODONE HYDROCHLORIDE 10 MG: 10 TABLET ORAL at 16:59

## 2024-10-03 RX ADMIN — POTASSIUM CHLORIDE 20 MEQ: 14.9 INJECTION, SOLUTION INTRAVENOUS at 09:32

## 2024-10-03 RX ADMIN — SIMETHICONE 80 MG: 80 TABLET, CHEWABLE ORAL at 20:54

## 2024-10-03 RX ADMIN — FUROSEMIDE 20 MG: 10 INJECTION, SOLUTION INTRAMUSCULAR; INTRAVENOUS at 09:32

## 2024-10-03 RX ADMIN — IRON SUCROSE 200 MG: 20 INJECTION, SOLUTION INTRAVENOUS at 16:51

## 2024-10-03 NOTE — ASSESSMENT & PLAN NOTE
S/p stat 1LTCS for fetal malpresentation at 41w complicated by retroperitoneal hemorrhage requiring take back for  ex lap, supracervical hysterectomy, left hypogastric artery ligation and  hysterectomy.   Management per ICU/OB primary   Rogers in place, adequate UOP, s/p IV Lasix 2 doses   Incision c/d/I  FEN: consider advance to clear liquids  DVT PPX: SCDs, consider addition of heparin for DVT prophylaxis if AM CBC remains stable  Recommend ambulation.  : recommended additional dose TXA, additional pRBC. Low threshold to image chest/abdomen/pelvis if not clinically improving  93: given poor pain control, can consider addition of toradol to pain regimen if Hgb stable

## 2024-10-03 NOTE — ASSESSMENT & PLAN NOTE
Total QBL 1703 mL   Secondary to Severe Uterine atony s/p methergine, hemabate, rectal cytotec, TXA, and Allyson intrauterine device  as well as expanding left retroperitoneal hematoma,  Total blood transfusion to date PRBC 10 units, FFP 4 units, Platelets 1 unit

## 2024-10-03 NOTE — ASSESSMENT & PLAN NOTE
POD#1 stat 1LTCS for fetal malpresentation at 41w complicated by retroperitoneal hemorrhage requiring take back for  ex lap, supracervical hysterectomy, left hypogastric artery ligation and  hysterectomy.   Management per ICU/OB primary   Rogers in place  Incision c/d/I  DVT PPX: SCDs

## 2024-10-03 NOTE — PROGRESS NOTES
Progress Note - GYN Oncology   Name: Buffy Poep 34 y.o. female I MRN: [id number]  Unit/Bed#: ICU 14 I Date of Admission: 10/1/2024   Date of Service: 10/3/2024 I Hospital Day: 2     34 y.o. POD# 2 s/p stat 1LTCS for fetal malpresentation at 41w complicated by acute blood loss anemia and retroperitoneal hemorrhage requiring take back for  ex lap, supracervical hysterectomy, left hypogastric artery ligation and  hysterectomy.   Assessment & Plan  Status post abdominal supracervical hysterectomy  S/p stat 1LTCS for fetal malpresentation at 41w complicated by retroperitoneal hemorrhage requiring take back for  ex lap, supracervical hysterectomy, left hypogastric artery ligation and  hysterectomy.   Management per ICU/OB primary   Rogers in place, adequate UOP, s/p IV Lasix 2 doses   Incision c/d/I  FEN: consider advance to clear liquids  DVT PPX: SCDs, consider addition of heparin for DVT prophylaxis if AM CBC remains stable  Recommend ambulation.  : recommended additional dose TXA, additional pRBC. Low threshold to image chest/abdomen/pelvis if not clinically improving  9/3: given poor pain control, can consider addition of toradol to pain regimen if Hgb stable  Status post primary low transverse  section  See above  Postpartum hemorrhage  Total QBL 1703 mL   Secondary to Severe Uterine atony s/p methergine, hemabate, rectal cytotec, TXA, and Allyson intrauterine device  as well as expanding left retroperitoneal hematoma,  Total blood transfusion to date PRBC 10 units, FFP 4 units, Platelets 1 unit   Acute blood loss anemia    Continue to trend. Can consider her spacing her frequency of labs  Hgb / Hct       Results from last 7 days   Lab Units 10/03/24  0450 10/02/24  1938 10/02/24  1451 10/02/24  1105 10/02/24  0507 10/02/24  0015 10/01/24  2104 10/01/24  1744   HEMOGLOBIN g/dL 9.3* 9.6* 7.9* 7.1* 7.7* 8.1* 9.0* 9.2*   HEMATOCRIT % 27.5* 27.8*  --  20.9* 22.1* 23.5* 26.1* 26.2*       Tachycardia  Improving s/p Lasix  Was previously 130s, HR mid 90s this AM  Pt reports no palpitations.    Thyroid    Lab Results   Component Value Date    [id number] 5.079 (H) 10/02/2024    FREET4 0.50 (L) 10/02/2024       For questions/concerns on this patient, please reach out to the following:  RA- GynOn Resident        [id number] was used for this encounter    Subjective:    Buffy Pope has had diffuse abdominal  pain overnight. No other current complaints.  Overnight events: required 2L O2 nasal cannula overnight, received lasix, now on room air, much improved.. Pain is being managed with scheduled tylenol, van prn and dilaudid pca, but hasn't been well controlled.  Patient  is currently voiding with talley in place.  She is not ambulating.  Patient is not currently passing flatus and has had no bowel movement. She currently NPO, and denies nausea or vomiting. Reports no appetite this morning. . Patient denies fever, chills, chest pain, shortness of breath, or calf tenderness.     Objective:  /73   Pulse 97   Temp 98.4 °F (36.9 °C) (Oral)   Resp (!) 8   LMP 12/15/2023 (Approximate)   SpO2 97%   Breastfeeding Yes     I/O last 3 completed shifts:  In: 35849.7 [I.V.:8256.8; Blood:4621.9; IV Piggyback:230]  Out: 4650 [Urine:4200; Blood:450]  I/O this shift:  In: 627 [I.V.:327; IV Piggyback:300]  Out: 2600 [Urine:2600]    Lab Results   Component Value Date    WBC 12.37 (H) 10/03/2024    HGB 9.3 (L) 10/03/2024    HCT 27.5 (L) 10/03/2024    MCV 83 10/03/2024     (L) 10/03/2024       Lab Results   Component Value Date    GLUCOSE 163 (H) 10/01/2024    CALCIUM 8.0 (L) 10/03/2024    K 3.6 10/03/2024    CO2 27 10/03/2024     10/03/2024    BUN 7 10/03/2024    CREATININE 0.44 (L) 10/03/2024           Physical Exam  Vitals reviewed.   Constitutional:       General: She is not in acute distress.     Comments: tearful this morning   HENT:      Head: Normocephalic and  atraumatic.   Cardiovascular:      Rate and Rhythm: Tachycardia present.   Pulmonary:      Effort: Pulmonary effort is normal. No respiratory distress.   Abdominal:      General: There is distension.      Tenderness: There is abdominal tenderness. There is no guarding or rebound.          Comments: Diffusely tender  Hypoactive bowel sounds  Inverted T incision with improving erythema,  Incision dry and in tact   Musculoskeletal:      Right lower leg: No edema.      Left lower leg: No edema.      Comments: SCDs on and running   Neurological:      Mental Status: She is alert.   Psychiatric:         Mood and Affect: Mood normal.         Thought Content: Thought content normal.         Judgment: Judgment normal.           Orestes Bai,   10/3/2024  6:18 AM

## 2024-10-03 NOTE — ASSESSMENT & PLAN NOTE
S/p 6U PRBC, 3U FFP, 1 pack platelet   Continue to trend  Hgb / Hct       Results from last 7 days   Lab Units 10/03/24  0450 10/02/24  1938 10/02/24  1451 10/02/24  1105 10/02/24  0507 10/02/24  0015 10/01/24  2104 10/01/24  1744   HEMOGLOBIN g/dL 9.3* 9.6* 7.9* 7.1* 7.7* 8.1* 9.0* 9.2*   HEMATOCRIT % 27.5* 27.8*  --  20.9* 22.1* 23.5* 26.1* 26.2*

## 2024-10-03 NOTE — ASSESSMENT & PLAN NOTE
Improving s/p Lasix  Was previously 130s, HR mid 90s this AM  Pt reports no palpitations.    Thyroid    Lab Results   Component Value Date    GYL8GVVNGIFC 5.079 (H) 10/02/2024    FREET4 0.50 (L) 10/02/2024

## 2024-10-03 NOTE — PLAN OF CARE
Problem: PAIN - ADULT  Goal: Verbalizes/displays adequate comfort level or baseline comfort level  Description: Interventions:  - Encourage patient to monitor pain and request assistance  - Assess pain using appropriate pain scale  - Administer analgesics based on type and severity of pain and evaluate response  - Implement non-pharmacological measures as appropriate and evaluate response  - Consider cultural and social influences on pain and pain management  - Notify physician/advanced practitioner if interventions unsuccessful or patient reports new pain  10/2/2024 2306 by Mile Corbett RN  Outcome: Progressing     Problem: CARDIOVASCULAR - ADULT  Goal: Maintains optimal cardiac output and hemodynamic stability  Description: INTERVENTIONS:  - Monitor I/O, vital signs and rhythm  - Monitor for S/S and trends of decreased cardiac output  - Assess quality of pulses, skin color and temperature  - Assess for signs of decreased coronary artery perfusion  - Instruct patient to report change in severity of symptoms  Outcome: Progressing     Problem: GASTROINTESTINAL - ADULT  Goal: Minimal or absence of nausea and/or vomiting  Description: INTERVENTIONS:  - Administer IV fluids if ordered to ensure adequate hydration  - Administer ordered antiemetic medications as needed  Outcome: Progressing     Problem: GASTROINTESTINAL - ADULT  Goal: Maintains or returns to baseline bowel function  Description: INTERVENTIONS:  - Assess bowel function  - Encourage oral fluids to ensure adequate hydration  - Administer IV fluids if ordered to ensure adequate hydration  Outcome: Progressing     Problem: GENITOURINARY - ADULT  Goal: Urinary catheter remains patent  Description: INTERVENTIONS:  - Assess patency of urinary catheter  - If patient has a chronic talley, consider changing catheter if non-functioning  - Follow guidelines for intermittent irrigation of non-functioning urinary catheter  Outcome: Progressing     Problem:  METABOLIC, FLUID AND ELECTROLYTES - ADULT  Goal: Electrolytes maintained within normal limits  Description: INTERVENTIONS:  - Monitor labs and assess patient for signs and symptoms of electrolyte imbalances  - Administer electrolyte replacement as ordered  - Monitor response to electrolyte replacements, including repeat lab results as appropriate  - Instruct patient on fluid and nutrition as appropriate  Outcome: Progressing     Problem: SKIN/TISSUE INTEGRITY - ADULT  Goal: Incision(s), wounds(s) or drain site(s) healing without S/S of infection  Description: INTERVENTIONS  - Assess and document dressing, incision, wound bed, drain sites and surrounding tissue  - Provide patient and family education  Outcome: Progressing     Problem: MUSCULOSKELETAL - ADULT  Goal: Maintain or return mobility to safest level of function  Description: INTERVENTIONS:  - Assess patient's ability to carry out ADLs; assess patient's baseline for ADL function and identify physical deficits which impact ability to perform ADLs (bathing, care of mouth/teeth, toileting, grooming, dressing, etc.)  - Assess/evaluate cause of self-care deficits   - Assess range of motion  - Assess patient's mobility  -Outcome: Progressing

## 2024-10-03 NOTE — PROGRESS NOTES
"  Progress Note - OB/GYN   Name: Buffy Pope 34 y.o. female I MRN: [id number]  Unit/Bed#: -01 I Date of Admission: 10/1/2024   Date of Service: 10/3/2024 I Hospital Day: 2     Assessment & Plan  Status post abdominal supracervical hysterectomy  34-year-old  postop day 2 from  delivery followed by return to the OR for retroperitoneal and broad ligament hematoma and hemodynamic instability now status post exploratory laparotomy, supracervical  hysterectomy, ligation of the left internal iliac artery and cystoscopy.  Patient now transferred back and stable on L&D floor.  Hg now stable at 9.6-tachycardia is improved-continue to trend labs.  Abdomen remains distended-transition to clears today and has been tolerating well.  Plan to advance as tolerated.  Continue Lasix given anasarca.  Continue close monitoring of I/Os  Given tolerating clears will transition to p.o. analgesia including Motrin/Tylenol, Lesvia 5/10.  Out of bed to chair, ambulation with assistance.   If ambulating without difficulty will plan to DC Rogers and voiding trial.  Encourage breast-feeding and breast pumping.  VTE prophylaxis: SCDs, ambulation and prophylactic Lovenox  Reviewed postoperative goals with patient and all questions and concerns addressed.    Postpartum hemorrhage    Status post primary low transverse  section    No prenatal care in current pregnancy    Acute blood loss anemia  Currently stable 9.6  Tachycardia  Improved to the 100s-continue to monitor vitals closely.    Subjective:     Patient transitioned from ICU to labor floor-reports some mild abdominal pain and feeling fatigued and overall swollen.  Denies chest pain, shortness of breath.  Tearful given she has not seen her other child in a couple of days.    /90   Pulse (!) 107   Temp 97.9 °F (36.6 °C) (Oral)   Resp 18   Ht 5' 2\" (1.575 m)   Wt 97.1 kg (214 lb 1.1 oz)   LMP 12/15/2023 (Approximate)   SpO2 96%   Breastfeeding " Yes   BMI 39.15 kg/m²   Physical Exam  Vitals reviewed.   Constitutional:       General: She is not in acute distress.     Appearance: Normal appearance. She is not ill-appearing or diaphoretic.   Cardiovascular:      Rate and Rhythm: Tachycardia present.      Heart sounds: No murmur heard.     No gallop.   Pulmonary:      Breath sounds: Normal breath sounds. No wheezing or rales.   Abdominal:      General: There is distension.      Tenderness: There is abdominal tenderness.      Comments: Abdomen is distended, there is some mild tenderness to palpation, voluntary guarding to palpation.  No rebound.  Incision is clean/dry/intact.  No overlying erythema or discharge noted.   Musculoskeletal:         General: Swelling present.   Skin:     General: Skin is warm and dry.   Neurological:      Mental Status: She is alert and oriented to person, place, and time.

## 2024-10-03 NOTE — ASSESSMENT & PLAN NOTE
Continue to trend. Can consider her spacing her frequency of labs  Hgb / Hct       Results from last 7 days   Lab Units 10/03/24  0450 10/02/24  1938 10/02/24  1451 10/02/24  1105 10/02/24  0507 10/02/24  0015 10/01/24  2104 10/01/24  1744   HEMOGLOBIN g/dL 9.3* 9.6* 7.9* 7.1* 7.7* 8.1* 9.0* 9.2*   HEMATOCRIT % 27.5* 27.8*  --  20.9* 22.1* 23.5* 26.1* 26.2*

## 2024-10-03 NOTE — PLAN OF CARE
Problem: POSTPARTUM  Goal: Experiences normal postpartum course  Description: INTERVENTIONS:  - Monitor maternal vital signs  - Assess uterine involution and lochia  Outcome: Progressing  Goal: Appropriate maternal -  bonding  Description: INTERVENTIONS:  - Identify family support  - Assess for appropriate maternal/infant bonding   -Encourage maternal/infant bonding opportunities  - Referral to  or  as needed  Outcome: Progressing  Goal: Establishment of infant feeding pattern  Description: INTERVENTIONS:  - Assess breast/bottle feeding  - Refer to lactation as needed  Outcome: Progressing  Goal: Incision(s), wounds(s) or drain site(s) healing without S/S of infection  Description: INTERVENTIONS  - Assess and document dressing, incision, wound bed, drain sites and surrounding tissue  - Provide patient and family education  - Perform skin care/dressing changes every prn  Outcome: Progressing     Problem: PAIN - ADULT  Goal: Verbalizes/displays adequate comfort level or baseline comfort level  Description: Interventions:  - Encourage patient to monitor pain and request assistance  - Assess pain using appropriate pain scale  - Administer analgesics based on type and severity of pain and evaluate response  - Implement non-pharmacological measures as appropriate and evaluate response  - Consider cultural and social influences on pain and pain management  - Notify physician/advanced practitioner if interventions unsuccessful or patient reports new pain  Outcome: Progressing     Problem: INFECTION - ADULT  Goal: Absence or prevention of progression during hospitalization  Description: INTERVENTIONS:  - Assess and monitor for signs and symptoms of infection  - Monitor lab/diagnostic results  - Monitor all insertion sites, i.e. indwelling lines, tubes, and drains  - Monitor endotracheal if appropriate and nasal secretions for changes in amount and color  - Duncan appropriate  cooling/warming therapies per order  - Administer medications as ordered  - Instruct and encourage patient and family to use good hand hygiene technique  - Identify and instruct in appropriate isolation precautions for identified infection/condition  Outcome: Progressing  Goal: Absence of fever/infection during neutropenic period  Description: INTERVENTIONS:  - Monitor WBC    Outcome: Progressing     Problem: SAFETY ADULT  Goal: Patient will remain free of falls  Description: INTERVENTIONS:  - Educate patient/family on patient safety including physical limitations  - Instruct patient to call for assistance with activity   - Consult OT/PT to assist with strengthening/mobility   - Keep Call bell within reach  - Keep bed low and locked with side rails adjusted as appropriate  - Keep care items and personal belongings within reach  - Initiate and maintain comfort rounds  - Make Fall Risk Sign visible to staff  - Offer Toileting every prn Hours, in advance of need  - Obtain necessary fall risk management equipment: prn  - Apply yellow socks and bracelet for high fall risk patients  - Consider moving patient to room near nurses station  Outcome: Progressing  Goal: Maintain or return to baseline ADL function  Description: INTERVENTIONS:  -  Assess patient's ability to carry out ADLs; assess patient's baseline for ADL function and identify physical deficits which impact ability to perform ADLs (bathing, care of mouth/teeth, toileting, grooming, dressing, etc.)  - Assess/evaluate cause of self-care deficits   - Assess range of motion  - Assess patient's mobility; develop plan if impaired  - Assess patient's need for assistive devices and provide as appropriate  - Encourage maximum independence but intervene and supervise when necessary  - Involve family in performance of ADLs  - Assess for home care needs following discharge   - Consider OT consult to assist with ADL evaluation and planning for discharge  - Provide patient  education as appropriate  Outcome: Progressing  Goal: Maintains/Returns to pre admission functional level  Description: INTERVENTIONS:  - Perform AM-PAC 6 Click Basic Mobility/ Daily Activity assessment daily.  - Set and communicate daily mobility goal to care team and patient/family/caregiver.   - Collaborate with rehabilitation services on mobility goals if consulted  - Perform Range of Motion prn times a day.  - Reposition patient every prn hours.  - Dangle patient prn times a day  - Stand patient prn times a day  - Ambulate patient prn times a day  - Out of bed to chair prn times a day   - Out of bed for meals prn times a day  - Out of bed for toileting  - Record patient progress and toleration of activity level   Outcome: Progressing     Problem: Knowledge Deficit  Goal: Patient/family/caregiver demonstrates understanding of disease process, treatment plan, medications, and discharge instructions  Description: Complete learning assessment and assess knowledge base.  Interventions:  - Provide teaching at level of understanding  - Provide teaching via preferred learning methods  Outcome: Progressing     Problem: DISCHARGE PLANNING  Goal: Discharge to home or other facility with appropriate resources  Description: INTERVENTIONS:  - Identify barriers to discharge w/patient and caregiver  - Arrange for needed discharge resources and transportation as appropriate  - Identify discharge learning needs (meds, wound care, etc.)  - Arrange for interpretive services to assist at discharge as needed  - Refer to Case Management Department for coordinating discharge planning if the patient needs post-hospital services based on physician/advanced practitioner order or complex needs related to functional status, cognitive ability, or social support system  Outcome: Progressing     Problem: NEUROSENSORY - ADULT  Goal: Achieves stable or improved neurological status  Description: INTERVENTIONS  - Monitor and report changes in  neurological status  - Monitor vital signs such as temperature, blood pressure, glucose, and any other labs ordered   - Initiate measures to prevent increased intracranial pressure  - Monitor for seizure activity and implement precautions if appropriate      Outcome: Progressing     Problem: CARDIOVASCULAR - ADULT  Goal: Maintains optimal cardiac output and hemodynamic stability  Description: INTERVENTIONS:  - Monitor I/O, vital signs and rhythm  - Monitor for S/S and trends of decreased cardiac output  - Administer and titrate ordered vasoactive medications to optimize hemodynamic stability  - Assess quality of pulses, skin color and temperature  - Assess for signs of decreased coronary artery perfusion  - Instruct patient to report change in severity of symptoms  Outcome: Progressing     Problem: GASTROINTESTINAL - ADULT  Goal: Minimal or absence of nausea and/or vomiting  Description: INTERVENTIONS:  - Administer IV fluids if ordered to ensure adequate hydration  - Maintain NPO status until nausea and vomiting are resolved  - Nasogastric tube if ordered  - Administer ordered antiemetic medications as needed  - Provide nonpharmacologic comfort measures as appropriate  - Advance diet as tolerated, if ordered  - Consider nutrition services referral to assist patient with adequate nutrition and appropriate food choices  Outcome: Progressing  Goal: Maintains or returns to baseline bowel function  Description: INTERVENTIONS:  - Assess bowel function  - Encourage oral fluids to ensure adequate hydration  - Administer IV fluids if ordered to ensure adequate hydration  - Administer ordered medications as needed  - Encourage mobilization and activity  - Consider nutritional services referral to assist patient with adequate nutrition and appropriate food choices  Outcome: Progressing     Problem: GENITOURINARY - ADULT  Goal: Maintains or returns to baseline urinary function  Description: INTERVENTIONS:  - Assess urinary  function  - Encourage oral fluids to ensure adequate hydration if ordered  - Administer IV fluids as ordered to ensure adequate hydration  - Administer ordered medications as needed  - Offer frequent toileting  - Follow urinary retention protocol if ordered  Outcome: Progressing  Goal: Urinary catheter remains patent  Description: INTERVENTIONS:  - Assess patency of urinary catheter  - If patient has a chronic talley, consider changing catheter if non-functioning  - Follow guidelines for intermittent irrigation of non-functioning urinary catheter  Outcome: Progressing     Problem: METABOLIC, FLUID AND ELECTROLYTES - ADULT  Goal: Electrolytes maintained within normal limits  Description: INTERVENTIONS:  - Monitor labs and assess patient for signs and symptoms of electrolyte imbalances  - Administer electrolyte replacement as ordered  - Monitor response to electrolyte replacements, including repeat lab results as appropriate  - Instruct patient on fluid and nutrition as appropriate  Outcome: Progressing     Problem: SKIN/TISSUE INTEGRITY - ADULT  Goal: Incision(s), wounds(s) or drain site(s) healing without S/S of infection  Description: INTERVENTIONS  - Assess and document dressing, incision, wound bed, drain sites and surrounding tissue  - Provide patient and family education  - Perform skin care/dressing changes every prn  Outcome: Progressing     Problem: MUSCULOSKELETAL - ADULT  Goal: Maintain or return mobility to safest level of function  Description: INTERVENTIONS:  - Assess patient's ability to carry out ADLs; assess patient's baseline for ADL function and identify physical deficits which impact ability to perform ADLs (bathing, care of mouth/teeth, toileting, grooming, dressing, etc.)  - Assess/evaluate cause of self-care deficits   - Assess range of motion  - Assess patient's mobility  - Assess patient's need for assistive devices and provide as appropriate  - Encourage maximum independence but intervene and  supervise when necessary  - Involve family in performance of ADLs  - Assess for home care needs following discharge   - Consider OT consult to assist with ADL evaluation and planning for discharge  - Provide patient education as appropriate  Outcome: Progressing

## 2024-10-03 NOTE — ASSESSMENT & PLAN NOTE
S/p emergent c-cestion, s/p ex-lap with supracervical  hysterectomy.  Pain well controlled.  Appropriate incisional tenderness.     Plan:  Continue Dilaudid PCA  Basal - 0  Bolus 0.3mg  LO - 10 minutes  Hourly max - 2.2mg  Transition to oral analgesia when able  Bowel regimen  Encourage IS

## 2024-10-03 NOTE — PROGRESS NOTES
Progress Note - Critical Care/ICU   Name: Buffy Pope 34 y.o. female I MRN: [id number]  Unit/Bed#: ICU 14 I Date of Admission: 10/1/2024   Date of Service: 10/3/2024 I Hospital Day: 2      Assessment & Plan  Status post abdominal supracervical hysterectomy  34-year-old G2, P2 POD #2 from  delivery followed by return to the OR for retroperitoneal and broad ligament hematoma and hemodynamic instability now status post exploratory laparotomy, supracervical  hysterectomy, ligation of the left internal iliac artery and cystoscopy.  Total blood transfusion to date PRBC 10 units, FFP 4 units, Platelets 1 unit  HD stable, remains intermittently tachycardic    Plan:  Continue to trend labs and transfuse as needed.  Continue close monitoring of I/Os  Continue to monitor VS  Serial abdominal exams    Acute blood loss anemia  2/2 emergent , retroperitoneal and broad ligament hematoma.  POD #2 -- 10/1 s/p exploratory laparotomy, supracervical  hysterectomy, ligation of the left internal iliac artery and cystoscopy.  Per chart review total blood products: PRBCs 10 units, FFP 4 units, Platelets 1 unit  Hemoglobin remains stable  BP stable     Plan:  CBC in am   Monitor for s/s bleeding   Transfuse for HGB < 7, hemodynamic instability, active bleeding  Status post primary low transverse  section  Routine postpartum care and reviewed with nursing to offer breast pump and reviewed expression if desired while in ICU.  Post-operative pain  S/p emergent c-cestion, s/p ex-lap with supracervical  hysterectomy.  Pain well controlled.  Appropriate incisional tenderness.     Plan:  Continue Dilaudid PCA  Basal - 0  Bolus 0.3mg  LO - 10 minutes  Hourly max - 2.2mg  Transition to oral analgesia when able  Bowel regimen  Encourage IS  No prenatal care in current pregnancy  OBGYN following, appreciate recommendations.   Postpartum hemorrhage  As below  Disposition: Med Surg    ICU Core  Measures     A: Assess, Prevent, and Manage Pain Has pain been assessed? Yes  Need for changes to pain regimen? No   B: Both SAT/SAT  N/A   C: Choice of Sedation RASS Goal: N/A patient not on sedation  Need for changes to sedation or analgesia regimen? No   D: Delirium CAM-ICU: Negative   E: Early Mobility  Plan for early mobility? Yes   F: Family Engagement Plan for family engagement today? Yes       Antibiotic Review: Post op requirements     Review of Invasive Devices:    Rogers Plan: Voiding trial after improvement in ambulation     Ivonne Plan: Discontinue arterial line    Prophylaxis:  VTE Contraindicated secondary to: Post-partum hemorrhage   Stress Ulcer  not ordered         24 Hour Events : Received 2 units PRBCs yesterday.  Hemoglobin remained stable. Excellent response to Lasix yesterday.  I/O slightly positive at midnight, given additional dose of Lasix with good response.  Net -1.8 L in 24 hours.  Pain well-controlled, on Dilaudid PCA.  No acute events overnight.    Subjective   Review of Systems: Review of Systems   Constitutional:  Negative for chills, fatigue and fever.   HENT: Negative.     Eyes: Negative.    Respiratory:  Negative for cough, chest tightness, shortness of breath and wheezing.    Cardiovascular:  Positive for leg swelling. Negative for chest pain and palpitations.   Gastrointestinal:  Positive for abdominal distention (Postpartum abdomen). Negative for abdominal pain, diarrhea, nausea and vomiting.   Endocrine: Negative.    Genitourinary:  Negative for decreased urine volume, difficulty urinating, dysuria and hematuria.   Musculoskeletal:  Negative for arthralgias and myalgias.   Skin:  Negative for color change, pallor, rash and wound.   Allergic/Immunologic: Negative.    Neurological:  Negative for dizziness, seizures, syncope and weakness.   Hematological: Negative.    Psychiatric/Behavioral: Negative.     All other systems reviewed and are negative.      Objective :                    Vitals I/O      Most Recent Min/Max in 24hrs   Temp 98.4 °F (36.9 °C) Temp  Min: 98.4 °F (36.9 °C)  Max: 100.1 °F (37.8 °C)   Pulse (!) 111 Pulse  Min: 108  Max: 134   Resp (!) 6 Resp  Min: 6  Max: 19   /66 BP  Min: 106/66  Max: 154/82   O2 Sat 96 % SpO2  Min: 83 %  Max: 96 %      Intake/Output Summary (Last 24 hours) at 10/3/2024 0026  Last data filed at 10/2/2024 2201  Gross per 24 hour   Intake 3990 ml   Output 3475 ml   Net 515 ml       Diet NPO; Sips with meds    Invasive Monitoring   Arterial Line  Ivonne /78  Arterial Line BP  Min: 100/58  Max: 164/86   MAP 95 mmHg  Arterial Line MAP (mmHg)  Min: 72 mmHg  Max: 114 mmHg           Physical Exam   Physical Exam  Vitals and nursing note reviewed.   Eyes:      General: Vision grossly intact. NeglectNo scleral icterus.        Right eye: No discharge.         Left eye: No discharge.      Extraocular Movements: Extraocular movements intact.      Conjunctiva/sclera: Conjunctivae normal.   Skin:     General: Skin is warm, dry and not mottled extremities.      Capillary Refill: Capillary refill takes less than 2 seconds.      Coloration: Skin is not jaundiced or pale.      Findings: No lesion, rash or wound.   HENT:      Head: Normocephalic and atraumatic.      Right Ear: Hearing normal. No drainage.      Left Ear: Hearing normal. No drainage.      Nose: No nasal deformity, nasal tenderness, congestion, rhinorrhea, epistaxis or nasogastric tube.      Mouth/Throat:      Mouth: Mucous membranes are moist. No injury or angioedema.      Dentition: Normal dentition.      Pharynx: No oropharyngeal exudate.   Neck:      Vascular: No JVD.   no midline tenderness Cardiovascular:      Rate and Rhythm: Normal rate and regular rhythm.      Pulses: No decreased pulses.      Heart sounds: No murmur heard.     No friction rub. No gallop.   Musculoskeletal:         General: No swelling, tenderness, deformity or signs of injury.      Right lower leg: No edema.      Left  lower leg: No edema.   Abdominal: General: Abdomen is protuberant. A surgical scar is present. Bowel sounds are normal. There is distension (Postpartum abdomen distention).     Palpations: Abdomen is soft.      Tenderness: There is abdominal tenderness (Appropriate incisional tenderness).       Constitutional:       General: She is not in acute distress.     Appearance: She is well-developed and well-nourished. She is not ill-appearing or toxic-appearing.      Interventions: She is not sedated, not intubated and not restrained.  Pulmonary:      Effort: Pulmonary effort is normal. No accessory muscle usage, respiratory distress or accessory muscle usage. She is not intubated.      Breath sounds: Normal breath sounds. No wheezing, rhonchi or rales.   Psychiatric:         Mood and Affect:  mood and affect abnormal.        Speech: Speech is not no receptive aphasia or no expressive aphasia.   Neurological:      General: No focal deficit present.      Mental Status: She is alert and oriented to person, place and time. Mental status is at baseline. She is CAM ICU negative.      Cranial Nerves: No dysarthria or facial asymmetry.      Sensory: Sensation is intact.      Motor: gross motor function is at baseline for patient. No pronator drift or motor deficit.          Diagnostic Studies        Lab Results: I have reviewed the following results:     Medications:  Scheduled PRN   acetaminophen, 650 mg, Q6H JOSIAH  docusate sodium, 100 mg, BID  furosemide, 20 mg, Once  metroNIDAZOLE, 500 mg, Q12H JOSIAH      calcium carbonate, 1,000 mg, Daily PRN  diphenhydrAMINE, 25 mg, Q6H PRN  ondansetron, 4 mg, Q8H PRN  oxyCODONE, 10 mg, Q4H PRN  oxyCODONE, 5 mg, Q4H PRN  simethicone, 80 mg, 4x Daily PRN       Continuous    HYDROmorphone,          Labs:   CBC    Recent Labs     10/01/24  1445 10/01/24  1709 10/02/24  0507 10/02/24  1105 10/02/24  1451 10/02/24  1938   WBC 12.02*   < > 9.38 9.99  --   --    HGB 10.5*   < > 7.7* 7.1* 7.9* 9.6*    HCT 29.8*   < > 22.1* 20.9*  --  27.8*   *   < > 108* 107*  --   --    BANDSPCT 3  --   --   --   --   --     < > = values in this interval not displayed.     BMP    Recent Labs     10/02/24  0507 10/02/24  1938   SODIUM 137 135   K 4.1 3.7   * 104   CO2 23 26   AGAP 4 5   BUN 11 8   CREATININE 0.62 0.46*   CALCIUM 7.7* 8.1*       Coags    Recent Labs     10/01/24  0714 10/01/24  1445 10/02/24  0507 10/02/24  1105   INR 1.08   < > 0.98 0.97   PTT 26  --   --   --     < > = values in this interval not displayed.        Additional Electrolytes  Recent Labs     10/01/24  1445 10/01/24  1804 10/02/24  0507 10/02/24  1105 10/02/24  1938   MG 1.7*  --  1.7*  --  1.6*   PHOS 3.2  --  3.2  --   --    CAIONIZED 1.20   < > 1.11* 1.11*  --     < > = values in this interval not displayed.          Blood Gas    Recent Labs     10/01/24  1511   PHART 7.395   [id number] 32.7*   PO2ART 99.5   XKE5RJL 19.6*   BEART -4.5   SOURCE Line, Arterial     Recent Labs     10/01/24  1511   SOURCE Line, Arterial    LFTs  Recent Labs     10/01/24  1445 10/02/24  0507   ALT 10 8   AST 21 15   ALKPHOS 89 75   ALB 2.7* 2.3*   TBILI 1.05* 0.44       Infectious  No recent results  Glucose  Recent Labs     10/01/24  0200 10/01/24  1445 10/02/24  0507 10/02/24  1938   GLUC 88 117 100 75

## 2024-10-03 NOTE — ASSESSMENT & PLAN NOTE
2/2 emergent , retroperitoneal and broad ligament hematoma.  POD #2 -- 10/1 s/p exploratory laparotomy, supracervical  hysterectomy, ligation of the left internal iliac artery and cystoscopy.  Per chart review total blood products: PRBCs 10 units, FFP 4 units, Platelets 1 unit  Hemoglobin remains stable  BP stable     Plan:  CBC in am   Monitor for s/s bleeding   Transfuse for HGB < 7, hemodynamic instability, active bleeding

## 2024-10-03 NOTE — ASSESSMENT & PLAN NOTE
34-year-old  postop day 2 from  delivery followed by return to the OR for retroperitoneal and broad ligament hematoma and hemodynamic instability now status post exploratory laparotomy, supracervical  hysterectomy, ligation of the left internal iliac artery and cystoscopy.  Patient now transferred back and stable on L&D floor.  Hg now stable at 9.6-tachycardia is improved-continue to trend labs.  Abdomen remains distended-transition to clears today and has been tolerating well.  Plan to advance as tolerated.  Continue Lasix given anasarca.  Continue close monitoring of I/Os  Given tolerating clears will transition to p.o. analgesia including Motrin/Tylenol, Crowley 5/10.  Out of bed to chair, ambulation with assistance.   If ambulating without difficulty will plan to DC Rogers and voiding trial.  Encourage breast-feeding and breast pumping.  VTE prophylaxis: SCDs, ambulation and prophylactic Lovenox  Reviewed postoperative goals with patient and all questions and concerns addressed.

## 2024-10-03 NOTE — ASSESSMENT & PLAN NOTE
34-year-old G2, P2 POD #2 from  delivery followed by return to the OR for retroperitoneal and broad ligament hematoma and hemodynamic instability now status post exploratory laparotomy, supracervical  hysterectomy, ligation of the left internal iliac artery and cystoscopy.  Total blood transfusion to date PRBC 10 units, FFP 4 units, Platelets 1 unit  HD stable, remains intermittently tachycardic    Plan:  Continue to trend labs and transfuse as needed.  Continue close monitoring of I/Os  Continue to monitor VS  Serial abdominal exams

## 2024-10-03 NOTE — CASE MANAGEMENT
Case Management Progress Note    Patient name Buffy Pope  Location ICU 14/ICU 14 MRN [id number]  : 1990 Date 10/3/2024       LOS (days): 2  Geometric Mean LOS (GMLOS) (days):   Days to GMLOS:        OBJECTIVE:        Current admission status: Inpatient  Preferred Pharmacy:   [facility]/pharmacy #[id number] - [facility] [address] [address]  Phone: [phone] Fax: [phone]    Primary Care Provider: No primary care provider on file.    Primary Insurance:   Secondary Insurance:     PROGRESS NOTE:    CM contacted Angel Luis Trotter from [facility] to make referral for patient and her baby.  CM will continue to follow for resources.

## 2024-10-04 PROBLEM — R00.0 TACHYCARDIA: Status: RESOLVED | Noted: 2024-10-03 | Resolved: 2024-10-04

## 2024-10-04 LAB
ANION GAP SERPL CALCULATED.3IONS-SCNC: 6 MMOL/L (ref 4–13)
BUN SERPL-MCNC: 6 MG/DL (ref 5–25)
CA-I BLD-SCNC: 1.12 MMOL/L (ref 1.12–1.32)
CALCIUM SERPL-MCNC: 8.2 MG/DL (ref 8.4–10.2)
CHLORIDE SERPL-SCNC: 103 MMOL/L (ref 96–108)
CO2 SERPL-SCNC: 28 MMOL/L (ref 21–32)
CREAT SERPL-MCNC: 0.48 MG/DL (ref 0.6–1.3)
ERYTHROCYTE [DISTWIDTH] IN BLOOD BY AUTOMATED COUNT: 16.1 % (ref 11.6–15.1)
EXTERNAL GROUP B STREP ANTIGEN: NORMAL
GFR SERPL CREATININE-BSD FRML MDRD: 128 ML/MIN/1.73SQ M
GLUCOSE SERPL-MCNC: 81 MG/DL (ref 65–140)
HCT VFR BLD AUTO: 26.3 % (ref 34.8–46.1)
HGB BLD-MCNC: 8.8 G/DL (ref 11.5–15.4)
MAGNESIUM SERPL-MCNC: 2 MG/DL (ref 1.9–2.7)
MCH RBC QN AUTO: 28.5 PG (ref 26.8–34.3)
MCHC RBC AUTO-ENTMCNC: 33.5 G/DL (ref 31.4–37.4)
MCV RBC AUTO: 85 FL (ref 82–98)
PHOSPHATE SERPL-MCNC: 3.4 MG/DL (ref 2.7–4.5)
PLATELET # BLD AUTO: 143 THOUSANDS/UL (ref 149–390)
PMV BLD AUTO: 10.1 FL (ref 8.9–12.7)
POTASSIUM SERPL-SCNC: 3.4 MMOL/L (ref 3.5–5.3)
RBC # BLD AUTO: 3.09 MILLION/UL (ref 3.81–5.12)
SODIUM SERPL-SCNC: 137 MMOL/L (ref 135–147)
WBC # BLD AUTO: 12.26 THOUSAND/UL (ref 4.31–10.16)

## 2024-10-04 PROCEDURE — 99024 POSTOP FOLLOW-UP VISIT: CPT | Performed by: OBSTETRICS & GYNECOLOGY

## 2024-10-04 PROCEDURE — 84100 ASSAY OF PHOSPHORUS: CPT | Performed by: STUDENT IN AN ORGANIZED HEALTH CARE EDUCATION/TRAINING PROGRAM

## 2024-10-04 PROCEDURE — 83735 ASSAY OF MAGNESIUM: CPT | Performed by: STUDENT IN AN ORGANIZED HEALTH CARE EDUCATION/TRAINING PROGRAM

## 2024-10-04 PROCEDURE — 80048 BASIC METABOLIC PNL TOTAL CA: CPT | Performed by: STUDENT IN AN ORGANIZED HEALTH CARE EDUCATION/TRAINING PROGRAM

## 2024-10-04 PROCEDURE — 85027 COMPLETE CBC AUTOMATED: CPT | Performed by: STUDENT IN AN ORGANIZED HEALTH CARE EDUCATION/TRAINING PROGRAM

## 2024-10-04 PROCEDURE — 99024 POSTOP FOLLOW-UP VISIT: CPT | Performed by: STUDENT IN AN ORGANIZED HEALTH CARE EDUCATION/TRAINING PROGRAM

## 2024-10-04 PROCEDURE — 82330 ASSAY OF CALCIUM: CPT | Performed by: STUDENT IN AN ORGANIZED HEALTH CARE EDUCATION/TRAINING PROGRAM

## 2024-10-04 RX ORDER — KETOROLAC TROMETHAMINE 30 MG/ML
15 INJECTION, SOLUTION INTRAMUSCULAR; INTRAVENOUS EVERY 6 HOURS SCHEDULED
Status: DISCONTINUED | OUTPATIENT
Start: 2024-10-04 | End: 2024-10-04

## 2024-10-04 RX ORDER — IBUPROFEN 600 MG/1
600 TABLET, FILM COATED ORAL EVERY 6 HOURS SCHEDULED
Status: DISCONTINUED | OUTPATIENT
Start: 2024-10-04 | End: 2024-10-05 | Stop reason: HOSPADM

## 2024-10-04 RX ORDER — POTASSIUM CHLORIDE 14.9 MG/ML
20 INJECTION INTRAVENOUS
Status: DISCONTINUED | OUTPATIENT
Start: 2024-10-04 | End: 2024-10-04

## 2024-10-04 RX ORDER — ENOXAPARIN SODIUM 100 MG/ML
40 INJECTION SUBCUTANEOUS
Status: DISCONTINUED | OUTPATIENT
Start: 2024-10-04 | End: 2024-10-05 | Stop reason: HOSPADM

## 2024-10-04 RX ORDER — IBUPROFEN 600 MG/1
600 TABLET, FILM COATED ORAL EVERY 6 HOURS PRN
Status: DISCONTINUED | OUTPATIENT
Start: 2024-10-04 | End: 2024-10-04

## 2024-10-04 RX ORDER — POTASSIUM CHLORIDE 1500 MG/1
40 TABLET, EXTENDED RELEASE ORAL 2 TIMES DAILY
Status: COMPLETED | OUTPATIENT
Start: 2024-10-04 | End: 2024-10-04

## 2024-10-04 RX ORDER — IBUPROFEN 600 MG/1
600 TABLET, FILM COATED ORAL EVERY 6 HOURS SCHEDULED
Status: DISCONTINUED | OUTPATIENT
Start: 2024-10-05 | End: 2024-10-04

## 2024-10-04 RX ORDER — POTASSIUM CHLORIDE 29.8 MG/ML
40 INJECTION INTRAVENOUS ONCE
Status: DISCONTINUED | OUTPATIENT
Start: 2024-10-04 | End: 2024-10-04

## 2024-10-04 RX ADMIN — DOCUSATE SODIUM 100 MG: 100 CAPSULE, LIQUID FILLED ORAL at 18:29

## 2024-10-04 RX ADMIN — SIMETHICONE 80 MG: 80 TABLET, CHEWABLE ORAL at 20:34

## 2024-10-04 RX ADMIN — IBUPROFEN 600 MG: 600 TABLET, FILM COATED ORAL at 18:29

## 2024-10-04 RX ADMIN — DOCUSATE SODIUM 100 MG: 100 CAPSULE, LIQUID FILLED ORAL at 10:45

## 2024-10-04 RX ADMIN — IBUPROFEN 600 MG: 600 TABLET, FILM COATED ORAL at 04:43

## 2024-10-04 RX ADMIN — ENOXAPARIN SODIUM 40 MG: 40 INJECTION SUBCUTANEOUS at 20:34

## 2024-10-04 RX ADMIN — SIMETHICONE 80 MG: 80 TABLET, CHEWABLE ORAL at 11:07

## 2024-10-04 RX ADMIN — ACETAMINOPHEN 325MG 650 MG: 325 TABLET ORAL at 06:17

## 2024-10-04 RX ADMIN — IBUPROFEN 600 MG: 600 TABLET, FILM COATED ORAL at 10:45

## 2024-10-04 RX ADMIN — POTASSIUM CHLORIDE 20 MEQ: 14.9 INJECTION, SOLUTION INTRAVENOUS at 07:51

## 2024-10-04 RX ADMIN — POTASSIUM CHLORIDE 40 MEQ: 1500 TABLET, EXTENDED RELEASE ORAL at 18:29

## 2024-10-04 RX ADMIN — ACETAMINOPHEN 325MG 650 MG: 325 TABLET ORAL at 20:34

## 2024-10-04 RX ADMIN — OXYCODONE HYDROCHLORIDE 10 MG: 10 TABLET ORAL at 06:32

## 2024-10-04 RX ADMIN — SIMETHICONE 80 MG: 80 TABLET, CHEWABLE ORAL at 14:54

## 2024-10-04 RX ADMIN — ACETAMINOPHEN 325MG 650 MG: 325 TABLET ORAL at 14:21

## 2024-10-04 RX ADMIN — POTASSIUM CHLORIDE 40 MEQ: 1500 TABLET, EXTENDED RELEASE ORAL at 10:45

## 2024-10-04 NOTE — ASSESSMENT & PLAN NOTE
Improving s/p Lasix  Was previously 130s, HR mid 90s this AM  Pt reports no palpitations.    Thyroid    Lab Results   Component Value Date    [id number] 5.079 (H) 10/02/2024    FREET4 0.50 (L) 10/02/2024

## 2024-10-04 NOTE — ASSESSMENT & PLAN NOTE
Total QBL during CS = 1703 mL   Secondary to expanding left retroperitoneal/broad ligament hematoma and severe uterine atony s/p methergine, hemabate, rectal cytotec, TXA, and Allyson intrauterine device followed by hysterectomy due to persistent atony  EBL during ex lap = 2L  Total blood products to date: 9u pRBC, 4u FFP, 1 pack platelets

## 2024-10-04 NOTE — PHYSICAL THERAPY NOTE
PHYSICAL THERAPY SCREEN NOTE          Patient Name: Buffy Pope  Today's Date: 10/4/2024             10/04/24 1003   Note Type   Note type Screen   Additional Comments PT orders received, chart review performed. Pt up and standing out of bed independently upon arrival to room. Pt is Hungarian speaking w/ family providing translation at bedside (offered pt use of [employer]  service, pt declined). Pt educated on role of PT in acute care to assess mobility and assist w/ DC recommendation. Pt reports she has no concerns regarding mobility, ambulation, and/or stair negotiation. Pt and family confirming she will have family support on DC to assist as needed. Pt asking about pelvic floor pt w/ pt and family educated that service is provided on an outpatient basis. Pt confirmed no further questions and/or concerns at this time. Will screen and DC pt from Inpatient PT caseload due to pt w/o acute PT needs. Please re-consult PT if needs arise. CHARLETTE Mendez and Resident Ashley Strauss updated post.         Sonia Quintanilla, PT, DPT  10/04/24

## 2024-10-04 NOTE — PLAN OF CARE
Problem: POSTPARTUM  Goal: Experiences normal postpartum course  Description: INTERVENTIONS:  - Monitor maternal vital signs  - Assess uterine involution and lochia  Outcome: Progressing  Goal: Appropriate maternal -  bonding  Description: INTERVENTIONS:  - Identify family support  - Assess for appropriate maternal/infant bonding   -Encourage maternal/infant bonding opportunities  - Referral to  or  as needed  Outcome: Progressing  Goal: Establishment of infant feeding pattern  Description: INTERVENTIONS:  - Assess breast/bottle feeding  - Refer to lactation as needed  Outcome: Progressing  Goal: Incision(s), wounds(s) or drain site(s) healing without S/S of infection  Description: INTERVENTIONS  - Assess and document dressing, incision, wound bed, drain sites and surrounding tissue  - Provide patient and family education  - Perform skin care/dressing changes prn  Outcome: Progressing     Problem: PAIN - ADULT  Goal: Verbalizes/displays adequate comfort level or baseline comfort level  Description: Interventions:  - Encourage patient to monitor pain and request assistance  - Assess pain using appropriate pain scale  - Administer analgesics based on type and severity of pain and evaluate response  - Implement non-pharmacological measures as appropriate and evaluate response  - Consider cultural and social influences on pain and pain management  - Notify physician/advanced practitioner if interventions unsuccessful or patient reports new pain  Outcome: Progressing     Problem: INFECTION - ADULT  Goal: Absence or prevention of progression during hospitalization  Description: INTERVENTIONS:  - Assess and monitor for signs and symptoms of infection  - Monitor lab/diagnostic results  - Monitor all insertion sites, i.e. indwelling lines, tubes, and drains  - Monitor endotracheal if appropriate and nasal secretions for changes in amount and color  - [place] appropriate cooling/warming  therapies per order  - Administer medications as ordered  - Instruct and encourage patient and family to use good hand hygiene technique  - Identify and instruct in appropriate isolation precautions for identified infection/condition  Outcome: Progressing  Goal: Absence of fever/infection during neutropenic period  Description: INTERVENTIONS:  - Monitor WBC    Outcome: Progressing     Problem: SAFETY ADULT  Goal: Patient will remain free of falls  Description: INTERVENTIONS:  - Educate patient/family on patient safety including physical limitations  - Instruct patient to call for assistance with activity   - Consult OT/PT to assist with strengthening/mobility   - Keep Call bell within reach  - Keep bed low and locked with side rails adjusted as appropriate  - Keep care items and personal belongings within reach  - Initiate and maintain comfort rounds  Outcome: Progressing  Goal: Maintain or return to baseline ADL function  Description: INTERVENTIONS:  -  Assess patient's ability to carry out ADLs; assess patient's baseline for ADL function and identify physical deficits which impact ability to perform ADLs (bathing, care of mouth/teeth, toileting, grooming, dressing, etc.)  - Assess/evaluate cause of self-care deficits   - Assess range of motion  - Assess patient's mobility; develop plan if impaired  - Assess patient's need for assistive devices and provide as appropriate  - Encourage maximum independence but intervene and supervise when necessary  - Involve family in performance of ADLs  - Assess for home care needs following discharge   - Consider OT consult to assist with ADL evaluation and planning for discharge  - Provide patient education as appropriate  Outcome: Progressing       Problem: Knowledge Deficit  Goal: Patient/family/caregiver demonstrates understanding of disease process, treatment plan, medications, and discharge instructions  Description: Complete learning assessment and assess knowledge  base.  Interventions:  - Provide teaching at level of understanding  - Provide teaching via preferred learning methods  Outcome: Progressing     Problem: DISCHARGE PLANNING  Goal: Discharge to home or other facility with appropriate resources  Description: INTERVENTIONS:  - Identify barriers to discharge w/patient and caregiver  - Arrange for needed discharge resources and transportation as appropriate  - Identify discharge learning needs (meds, wound care, etc.)  - Arrange for interpretive services to assist at discharge as needed  - Refer to Case Management Department for coordinating discharge planning if the patient needs post-hospital services based on physician/advanced practitioner order or complex needs related to functional status, cognitive ability, or social support system  Outcome: Progressing     Problem: NEUROSENSORY - ADULT  Goal: Achieves stable or improved neurological status  Description: INTERVENTIONS  - Monitor and report changes in neurological status  - Monitor vital signs such as temperature, blood pressure, glucose, and any other labs ordered   - Initiate measures to prevent increased intracranial pressure  - Monitor for seizure activity and implement precautions if appropriate      Outcome: Progressing     Problem: CARDIOVASCULAR - ADULT  Goal: Maintains optimal cardiac output and hemodynamic stability  Description: INTERVENTIONS:  - Monitor I/O, vital signs and rhythm  - Monitor for S/S and trends of decreased cardiac output  - Administer and titrate ordered vasoactive medications to optimize hemodynamic stability  - Assess quality of pulses, skin color and temperature  - Assess for signs of decreased coronary artery perfusion  - Instruct patient to report change in severity of symptoms  Outcome: Progressing     Problem: GASTROINTESTINAL - ADULT  Goal: Minimal or absence of nausea and/or vomiting  Description: INTERVENTIONS:  - Administer IV fluids if ordered to ensure adequate hydration  -  Maintain NPO status until nausea and vomiting are resolved  - Nasogastric tube if ordered  - Administer ordered antiemetic medications as needed  - Provide nonpharmacologic comfort measures as appropriate  - Advance diet as tolerated, if ordered  - Consider nutrition services referral to assist patient with adequate nutrition and appropriate food choices  Outcome: Progressing  Goal: Maintains or returns to baseline bowel function  Description: INTERVENTIONS:  - Assess bowel function  - Encourage oral fluids to ensure adequate hydration  - Administer IV fluids if ordered to ensure adequate hydration  - Administer ordered medications as needed  - Encourage mobilization and activity  - Consider nutritional services referral to assist patient with adequate nutrition and appropriate food choices  Outcome: Progressing     Problem: GENITOURINARY - ADULT  Goal: Maintains or returns to baseline urinary function  Description: INTERVENTIONS:  - Assess urinary function  - Encourage oral fluids to ensure adequate hydration if ordered  - Administer IV fluids as ordered to ensure adequate hydration  - Administer ordered medications as needed  - Offer frequent toileting  - Follow urinary retention protocol if ordered  Outcome: Progressing  Goal: Urinary catheter remains patent  Description: INTERVENTIONS:  - Assess patency of urinary catheter  - If patient has a chronic talley, consider changing catheter if non-functioning  - Follow guidelines for intermittent irrigation of non-functioning urinary catheter  Outcome: Progressing     Problem: METABOLIC, FLUID AND ELECTROLYTES - ADULT  Goal: Electrolytes maintained within normal limits  Description: INTERVENTIONS:  - Monitor labs and assess patient for signs and symptoms of electrolyte imbalances  - Administer electrolyte replacement as ordered  - Monitor response to electrolyte replacements, including repeat lab results as appropriate  - Instruct patient on fluid and nutrition as  appropriate  Outcome: Progressing          Problem: MUSCULOSKELETAL - ADULT  Goal: Maintain or return mobility to safest level of function  Description: INTERVENTIONS:  - Assess patient's ability to carry out ADLs; assess patient's baseline for ADL function and identify physical deficits which impact ability to perform ADLs (bathing, care of mouth/teeth, toileting, grooming, dressing, etc.)  - Assess/evaluate cause of self-care deficits   - Assess range of motion  - Assess patient's mobility  - Assess patient's need for assistive devices and provide as appropriate  - Encourage maximum independence but intervene and supervise when necessary  - Involve family in performance of ADLs  - Assess for home care needs following discharge   - Consider OT consult to assist with ADL evaluation and planning for discharge  - Provide patient education as appropriate  Outcome: Progressing

## 2024-10-04 NOTE — PROGRESS NOTES
Progress Note - GYN Oncology   Name: Buffy Pope 34 y.o. female I MRN: [id number]  Unit/Bed#: -01 I Date of Admission: 10/1/2024   Date of Service: 10/4/2024 I Hospital Day: 3     34 y.o. POD#3  s/p stat 1LTCS for fetal malpresentation at 41w complicated by acute blood loss anemia and retroperitoneal hemorrhage requiring take back for  ex lap, supracervical hysterectomy, left hypogastric artery ligation and  hysterectomy.   Assessment & Plan  Status post abdominal supracervical hysterectomy  S/p stat 1LTCS for fetal malpresentation at 41w complicated by retroperitoneal hemorrhage requiring take back for  ex lap, supracervical hysterectomy, left hypogastric artery ligation and  hysterectomy.   Management per OB primary team  Voiding spontaneously, s/p IV Lasix 4 doses   Incision c/d/I  FEN: tolerating clear liquids, advance as tolerated  DVT PPX: SCDs, Lovenox BID  Recommend ambulation. PT/OT following.     Status post primary low transverse  section  See above  Postpartum hemorrhage  Total QBL 1703 mL   Secondary to Severe Uterine atony s/p methergine, hemabate, rectal cytotec, TXA, and Allyson intrauterine device  as well as expanding left retroperitoneal hematoma,  Total blood transfusion to date PRBC 10 units, FFP 4 units, Platelets 1 unit   Acute blood loss anemia    Continue to trend. Can consider her spacing her frequency of labs  Hgb / Hct       Results from last 7 days   Lab Units 10/04/24  0441 10/03/24  1831 10/03/24  0927 10/03/24  0450 10/02/24  1938 10/02/24  1451 10/02/24  1105 10/02/24  0507   HEMOGLOBIN g/dL 8.8* 10.9* 9.6* 9.3* 9.6* 7.9* 7.1* 7.7*   HEMATOCRIT % 26.3* 32.4* 28.1* 27.5* 27.8*  --  20.9* 22.1*      Tachycardia (Resolved: 10/4/2024)  Improving s/p Lasix  Was previously 130s, HR mid 90s this AM  Pt reports no palpitations.    Thyroid    Lab Results   Component Value Date    TEY4RMURNOTQ 5.079 (H) 10/02/2024    FREET4 0.50 (L) 10/02/2024       For  "questions/concerns on this patient, please reach out to the following:  RA- GynOn Resident        [id number] was used for this encounter    Subjective:    Buffy Pope has had improved pain control overnight, but still continued pain with ambulastion. She has been belching overnight with some reflux symptoms . No other current complaints.  Overnight events: none.  Pain is being managed with scheduled tylenol, toradol and van 5, 10 PRN. Pain has been better controlled overnight than yesterday. Patient  is currently voiding without issue.  She is ambulating.  Patient is not currently passing flatus and has had no bowel movement. She currently tolerating clear lquids. , and denies nausea or vomiting. Reports no appetite this morning. . Patient denies fever, chills, chest pain, shortness of breath, or calf tenderness.     Objective:  /87 (BP Location: Left arm)   Pulse 86   Temp 97.9 °F (36.6 °C) (Oral)   Resp 18   Ht 5' 2\" (1.575 m)   Wt 97.1 kg (214 lb 1.1 oz)   LMP 12/15/2023 (Approximate)   SpO2 98%   Breastfeeding Yes   BMI 39.15 kg/m²     I/O last 3 completed shifts:  In: 3435 [I.V.:1885; Blood:1000; IV Piggyback:550]  Out: 8275 [Urine:8275]  I/O this shift:  In: -   Out: 1300 [Urine:1300]    Lab Results   Component Value Date    WBC 12.26 (H) 10/04/2024    HGB 8.8 (L) 10/04/2024    HCT 26.3 (L) 10/04/2024    MCV 85 10/04/2024     (L) 10/04/2024       Lab Results   Component Value Date    GLUCOSE 163 (H) 10/01/2024    CALCIUM 8.2 (L) 10/04/2024    K 3.4 (L) 10/04/2024    CO2 28 10/04/2024     10/04/2024    BUN 6 10/04/2024    CREATININE 0.48 (L) 10/04/2024           Physical Exam  Vitals reviewed.   Constitutional:       General: She is not in acute distress.  HENT:      Head: Normocephalic and atraumatic.   Cardiovascular:      Rate and Rhythm: Normal rate.      Pulses: Normal pulses.   Pulmonary:      Effort: Pulmonary effort is normal. No respiratory distress. "   Abdominal:      General: There is distension.      Tenderness: There is abdominal tenderness. There is no guarding or rebound.          Comments: Diffusely tender  Hypoactive bowel sounds  Inverted T incision with improving erythema,  Incision dry and in tact   Musculoskeletal:      Right lower leg: No edema.      Left lower leg: No edema.      Comments: SCDs on and running   Neurological:      Mental Status: She is alert.   Psychiatric:         Mood and Affect: Mood normal.         Thought Content: Thought content normal.         Judgment: Judgment normal.           Orestes Bai,   10/4/2024  6:08 AM

## 2024-10-04 NOTE — ASSESSMENT & PLAN NOTE
Hemoglobin stable, consider less frequent lab draws       Results from last 7 days   Lab Units 10/04/24  0441 10/03/24  1831 10/03/24  0927 10/03/24  0450 10/02/24  1938 10/02/24  1451 10/02/24  1105 10/02/24  0507   HEMOGLOBIN g/dL 8.8* 10.9* 9.6* 9.3* 9.6* 7.9* 7.1* 7.7*   HEMATOCRIT % 26.3* 32.4* 28.1* 27.5* 27.8*  --  20.9* 22.1*       IV discontinued, cath removed intact

## 2024-10-04 NOTE — CASE MANAGEMENT
Case Management Progress Note    Patient name Buffy Pope  Location /-01 MRN [id number]  : 1990 Date 10/4/2024       LOS (days): 3  Geometric Mean LOS (GMLOS) (days):   Days to GMLOS:        OBJECTIVE:        Current admission status: Inpatient  Preferred Pharmacy:   [facility]/pharmacy #[id number] - [address] [address]  Phone: [phone] Fax: [phone]    Primary Care Provider: No primary care provider on file.    Primary Insurance:   Secondary Insurance:     PROGRESS NOTE:    CM met with MOB bedside to discuss [facility] referral and discuss resources.  MOB was in a lot of pain and CM to return when  comes to hospital this afternoon.  CM will continue to follow.

## 2024-10-04 NOTE — CASE MANAGEMENT
Case Management Progress Note    Patient name Buffy Pope  Location /-01 MRN [id number]  : 1990 Date 10/4/2024       LOS (days): 3  Geometric Mean LOS (GMLOS) (days):   Days to GMLOS:        OBJECTIVE:        Current admission status: Inpatient  Preferred Pharmacy:   [facility]/pharmacy #[id number] - [facility] [address] [address]  Phone: [phone] Fax: [phone]    Primary Care Provider: No primary care provider on file.    Primary Insurance:   Secondary Insurance:     PROGRESS NOTE:      CM met with MOB to introduce CM services, complete assessment, and provide CM contact info.    MOB reported the following:    Assessment:  Consult reason: Inadequate Prenatal Care and Social Issues  Gestational Age at Birth: Full term unknown  MOB Name (& age if teen):   Buffy Pope  FOB Name (& age if teen MOB): Dick     Other Legal Guardian(s) for Baby:  n/a    Other Children:   8 year old and baby  Housing Plan/Lives with:    and child  Insurance Coverage/Plan for Baby: CM referred patient to [facility].- Referred to [facility]  Support System: Family and Spouse/Significant Other  Care Items: Car Seat, Crib/Bassinet (Safe Sleep Space), Diapers/Wipes, and Clothing  Method of Feeding: Breast Feeding  Breast Pump: No insurance   Government Assistance Programs: None   Arrangements: MOB  Current Employment/Schooling: MOB staying home with baby  Mental Health History and/or Treatment:   None reported   Substance Use History and/or Treatment:  None reported    Urine Drug Screen Results: Not Applicable  Children & Youth History: None  Current Legal Issues: N/A  Domestic/Intimate Partner Violence History: Denies.  NICU Resources: N/A    Discharge Plan:  Pediatrician:   [facility]  Prenatal/ Care:  TBD  Follow-Up Appointments Needed/Scheduled: TBD  Medications/DME/Other Referrals: TBD  Transportation Plan: KIMBERLEE has a  vehicle    Follow-Up Needed from Care Management:         CM used [employer] to speak with MOB.  CM encouraged her to have  contact SEAN Trotter and provided phone number to assist MOB in getting emergency Medicaid and baby insurance.  MOB had no prenatal care, reported due to no insurance did not go to any appointments.  No further case management needs.

## 2024-10-04 NOTE — OCCUPATIONAL THERAPY NOTE
Occupational Therapy Screen     10/04/24 1000   OT Last Visit   OT Visit Date 10/04/24   Note Type   Note type Screen   Additional Comments Spoke with pt this AM and pt reports that she has no concerns regarding d/c to home.  She has supportive family who are present for discussion and report they will assist her with LB self care and IADLs as needed.  Pt ambulating in room without assist.  No skilled OT needs and OT orders to be d/c at this time.     Magalis Golden, KAMALJIT, OTR/L, CSRS  [place] License [id number]  [place] License [id number]

## 2024-10-04 NOTE — ASSESSMENT & PLAN NOTE
Continue to trend. Can consider her spacing her frequency of labs  Hgb / Hct       Results from last 7 days   Lab Units 10/04/24  0441 10/03/24  1831 10/03/24  0927 10/03/24  0450 10/02/24  1938 10/02/24  1451 10/02/24  1105 10/02/24  0507   HEMOGLOBIN g/dL 8.8* 10.9* 9.6* 9.3* 9.6* 7.9* 7.1* 7.7*   HEMATOCRIT % 26.3* 32.4* 28.1* 27.5* 27.8*  --  20.9* 22.1*

## 2024-10-04 NOTE — ASSESSMENT & PLAN NOTE
Patient now transferred back and stable on L&D floor.  Hgb stable at 9.6  Tachycardia resolved  Abdomen remains distended but soft, tolerating clears, will advance diet to regular  S/p lasix, voiding spontaneously  Continue close monitoring of I/Os  Pain: sched Motrin/Tylenol, Winnie 5/10.  Out of bed to chair, ambulation with assistance.   VTE prophylaxis: SCDs, ambulation and Lovenox 40mg qd    Anticipate discharge once able to tolerate PO and pain controlled on PO regimen

## 2024-10-04 NOTE — PLAN OF CARE
Problem: POSTPARTUM  Goal: Experiences normal postpartum course  Description: INTERVENTIONS:  - Monitor maternal vital signs  - Assess uterine involution and lochia  Outcome: Progressing  Goal: Appropriate maternal -  bonding  Description: INTERVENTIONS:  - Identify family support  - Assess for appropriate maternal/infant bonding   -Encourage maternal/infant bonding opportunities  - Referral to  or  as needed  Outcome: Progressing  Goal: Establishment of infant feeding pattern  Description: INTERVENTIONS:  - Assess breast/bottle feeding  - Refer to lactation as needed  Outcome: Progressing  Goal: Incision(s), wounds(s) or drain site(s) healing without S/S of infection  Description: INTERVENTIONS  - Assess and document dressing, incision, wound bed, drain sites and surrounding tissue  - Provide patient and family education    Outcome: Progressing     Problem: PAIN - ADULT  Goal: Verbalizes/displays adequate comfort level or baseline comfort level  Description: Interventions:  - Encourage patient to monitor pain and request assistance  - Assess pain using appropriate pain scale  - Administer analgesics based on type and severity of pain and evaluate response  - Implement non-pharmacological measures as appropriate and evaluate response  - Consider cultural and social influences on pain and pain management  - Notify physician/advanced practitioner if interventions unsuccessful or patient reports new pain  Outcome: Progressing     Problem: INFECTION - ADULT  Goal: Absence or prevention of progression during hospitalization  Description: INTERVENTIONS:  - Assess and monitor for signs and symptoms of infection  - Monitor lab/diagnostic results  - Monitor all insertion sites, i.e. indwelling lines, tubes, and drains  - Monitor endotracheal if appropriate and nasal secretions for changes in amount and color  - [place] appropriate cooling/warming therapies per order  - Administer  medications as ordered  - Instruct and encourage patient and family to use good hand hygiene technique  - Identify and instruct in appropriate isolation precautions for identified infection/condition  Outcome: Progressing  Goal: Absence of fever/infection during neutropenic period  Description: INTERVENTIONS:  - Monitor WBC    Outcome: Progressing     Problem: SAFETY ADULT  Goal: Patient will remain free of falls  Description: INTERVENTIONS:  - Educate patient/family on patient safety including physical limitations  - Instruct patient to call for assistance with activity   - Consult OT/PT to assist with strengthening/mobility   - Keep Call bell within reach  - Keep bed low and locked with side rails adjusted as appropriate  - Keep care items and personal belongings within reach  - Initiate and maintain comfort rounds  - Make Fall Risk Sign visible to staff    - Apply yellow socks and bracelet for high fall risk patients  - Consider moving patient to room near nurses station  Outcome: Progressing  Goal: Maintain or return to baseline ADL function  Description: INTERVENTIONS:  -  Assess patient's ability to carry out ADLs; assess patient's baseline for ADL function and identify physical deficits which impact ability to perform ADLs (bathing, care of mouth/teeth, toileting, grooming, dressing, etc.)  - Assess/evaluate cause of self-care deficits   - Assess range of motion  - Assess patient's mobility; develop plan if impaired  - Assess patient's need for assistive devices and provide as appropriate  - Encourage maximum independence but intervene and supervise when necessary  - Involve family in performance of ADLs  - Assess for home care needs following discharge   - Consider OT consult to assist with ADL evaluation and planning for discharge  - Provide patient education as appropriate  Outcome: Progressing  Goal: Maintains/Returns to pre admission functional level  Description: INTERVENTIONS:  - Perform AM-PAC 6 Click  Basic Mobility/ Daily Activity assessment daily.  - Set and communicate daily mobility goal to care team and patient/family/caregiver.   - Collaborate with rehabilitation services on mobility goals if consulted    - Out of bed for toileting  - Record patient progress and toleration of activity level   Outcome: Progressing     Problem: Knowledge Deficit  Goal: Patient/family/caregiver demonstrates understanding of disease process, treatment plan, medications, and discharge instructions  Description: Complete learning assessment and assess knowledge base.  Interventions:  - Provide teaching at level of understanding  - Provide teaching via preferred learning methods  Outcome: Progressing     Problem: DISCHARGE PLANNING  Goal: Discharge to home or other facility with appropriate resources  Description: INTERVENTIONS:  - Identify barriers to discharge w/patient and caregiver  - Arrange for needed discharge resources and transportation as appropriate  - Identify discharge learning needs (meds, wound care, etc.)  - Arrange for interpretive services to assist at discharge as needed  - Refer to Case Management Department for coordinating discharge planning if the patient needs post-hospital services based on physician/advanced practitioner order or complex needs related to functional status, cognitive ability, or social support system  Outcome: Progressing     Problem: NEUROSENSORY - ADULT  Goal: Achieves stable or improved neurological status  Description: INTERVENTIONS  - Monitor and report changes in neurological status  - Monitor vital signs such as temperature, blood pressure, glucose, and any other labs ordered   - Initiate measures to prevent increased intracranial pressure  - Monitor for seizure activity and implement precautions if appropriate      Outcome: Progressing     Problem: CARDIOVASCULAR - ADULT  Goal: Maintains optimal cardiac output and hemodynamic stability  Description: INTERVENTIONS:  - Monitor I/O,  vital signs and rhythm  - Monitor for S/S and trends of decreased cardiac output  - Administer and titrate ordered vasoactive medications to optimize hemodynamic stability  - Assess quality of pulses, skin color and temperature  - Assess for signs of decreased coronary artery perfusion  - Instruct patient to report change in severity of symptoms  Outcome: Progressing     Problem: GASTROINTESTINAL - ADULT  Goal: Minimal or absence of nausea and/or vomiting  Description: INTERVENTIONS:  - Administer IV fluids if ordered to ensure adequate hydration  - Maintain NPO status until nausea and vomiting are resolved  - Nasogastric tube if ordered  - Administer ordered antiemetic medications as needed  - Provide nonpharmacologic comfort measures as appropriate  - Advance diet as tolerated, if ordered  - Consider nutrition services referral to assist patient with adequate nutrition and appropriate food choices  Outcome: Progressing  Goal: Maintains or returns to baseline bowel function  Description: INTERVENTIONS:  - Assess bowel function  - Encourage oral fluids to ensure adequate hydration  - Administer IV fluids if ordered to ensure adequate hydration  - Administer ordered medications as needed  - Encourage mobilization and activity  - Consider nutritional services referral to assist patient with adequate nutrition and appropriate food choices  Outcome: Progressing     Problem: GENITOURINARY - ADULT  Goal: Maintains or returns to baseline urinary function  Description: INTERVENTIONS:  - Assess urinary function  - Encourage oral fluids to ensure adequate hydration if ordered  - Administer IV fluids as ordered to ensure adequate hydration  - Administer ordered medications as needed  - Offer frequent toileting  - Follow urinary retention protocol if ordered  Outcome: Progressing  Goal: Urinary catheter remains patent  Description: INTERVENTIONS:  - Assess patency of urinary catheter  - If patient has a chronic talley, consider  changing catheter if non-functioning  - Follow guidelines for intermittent irrigation of non-functioning urinary catheter  Outcome: Progressing     Problem: METABOLIC, FLUID AND ELECTROLYTES - ADULT  Goal: Electrolytes maintained within normal limits  Description: INTERVENTIONS:  - Monitor labs and assess patient for signs and symptoms of electrolyte imbalances  - Administer electrolyte replacement as ordered  - Monitor response to electrolyte replacements, including repeat lab results as appropriate  - Instruct patient on fluid and nutrition as appropriate  Outcome: Progressing     Problem: SKIN/TISSUE INTEGRITY - ADULT  Goal: Incision(s), wounds(s) or drain site(s) healing without S/S of infection  Description: INTERVENTIONS  - Assess and document dressing, incision, wound bed, drain sites and surrounding tissue  - Provide patient and family education    Outcome: Progressing     Problem: MUSCULOSKELETAL - ADULT  Goal: Maintain or return mobility to safest level of function  Description: INTERVENTIONS:  - Assess patient's ability to carry out ADLs; assess patient's baseline for ADL function and identify physical deficits which impact ability to perform ADLs (bathing, care of mouth/teeth, toileting, grooming, dressing, etc.)  - Assess/evaluate cause of self-care deficits   - Assess range of motion  - Assess patient's mobility  - Assess patient's need for assistive devices and provide as appropriate  - Encourage maximum independence but intervene and supervise when necessary  - Involve family in performance of ADLs  - Assess for home care needs following discharge   - Consider OT consult to assist with ADL evaluation and planning for discharge  - Provide patient education as appropriate  Outcome: Progressing

## 2024-10-04 NOTE — PROGRESS NOTES
Progress Note - OB/GYN  Buffy Pope 34 y.o. female MRN: [id number]  Unit/Bed#: -01 Encounter: [id number]    Assessment and Plan     Buffy Pope is a patient of: [facility] . She is a 34-year-old  POD#3 from emergent  delivery followed by return to the OR for retroperitoneal and broad ligament hematoma and hemodynamic instability who then underwent ex lap, supracervical hyst, ligation of the left internal iliac artery and cystoscopy.  Recovering well and is stable.      * Status post abdominal supracervical hysterectomy  Assessment & Plan  Patient now transferred back and stable on L&D floor.  Hgb stable at 9.6  Tachycardia resolved  Abdomen remains distended but soft, tolerating clears, will advance diet to regular  S/p lasix, voiding spontaneously  Continue close monitoring of I/Os  Pain: sched Motrin/Tylenol, Winnie 5/10.  Out of bed to chair, ambulation with assistance.   VTE prophylaxis: SCDs, ambulation and Lovenox 40mg qd    Anticipate discharge once able to tolerate PO and pain controlled on PO regimen    Acute blood loss anemia  Assessment & Plan  Hemoglobin stable, consider less frequent lab draws       Results from last 7 days   Lab Units 10/04/24  0441 10/03/24  1831 10/03/24  0927 10/03/24  0450 10/02/24  1938 10/02/24  1451 10/02/24  1105 10/02/24  0507   HEMOGLOBIN g/dL 8.8* 10.9* 9.6* 9.3* 9.6* 7.9* 7.1* 7.7*   HEMATOCRIT % 26.3* 32.4* 28.1* 27.5* 27.8*  --  20.9* 22.1*        Postpartum hemorrhage  Assessment & Plan  Total QBL during CS = 1703 mL   Secondary to expanding left retroperitoneal/broad ligament hematoma and severe uterine atony s/p methergine, hemabate, rectal cytotec, TXA, and Allyson intrauterine device followed by hysterectomy due to persistent atony  EBL during ex lap = 2L  Total blood products to date: 9u pRBC, 4u FFP, 1 pack platelets     No prenatal care in current pregnancy  Assessment & Plan  CM consulted    Status post primary low transverse  " section  Assessment & Plan   doing well  Routine postpartum care         Disposition    - Anticipate discharge home on POD# 4-5      Subjective/Objective     Chief Complaint: Postpartum State     Subjective:    Buffy Pope is POD#3 from emergent  delivery followed by return to the OR for retroperitoneal and broad ligament hematoma and hemodynamic instability who then underwent ex lap, supracervical hyst, ligation of the left internal iliac artery and cystoscopy. She has no current complaints.  Pain is improved with medications.  Patient is currently voiding.  She is not ambulating.  Patient is not currently passing flatus and has had no bowel movement. She is tolerating a clear liquid diet, and denies nausea or vomitting. Patient denies fever, chills, chest pain, shortness of breath, or calf tenderness. She is  Using breast pump. She is recovering well and is stable.       Vitals:   /87 (BP Location: Left arm)   Pulse 86   Temp 97.9 °F (36.6 °C) (Oral)   Resp 18   Ht 5' 2\" (1.575 m)   Wt 97.1 kg (214 lb 1.1 oz)   LMP 12/15/2023 (Approximate)   SpO2 98%   Breastfeeding Yes   BMI 39.15 kg/m²       Intake/Output Summary (Last 24 hours) at 10/4/2024 0757  Last data filed at 10/3/2024 2312  Gross per 24 hour   Intake 200 ml   Output 4000 ml   Net -3800 ml       Invasive Devices       Peripheral Intravenous Line  Duration             Peripheral IV 10/01/24 Left Antecubital 3 days    Peripheral IV 10/01/24 Right Forearm 3 days                    Physical Exam:   GEN: Buffy Pope appears well, alert and oriented x 3, pleasant and cooperative   CARDIO: RRR, no murmurs or rubs  RESP:  CTAB, no wheezes or rales  ABDOMEN: soft, no tenderness, no distention, incision C/D/I  EXTREMITIES: SCDs on, non tender, no erythema      Labs:     Hemoglobin   Date Value Ref Range Status   10/04/2024 8.8 (L) 11.5 - 15.4 g/dL Final   10/03/2024 10.9 (L) 11.5 - 15.4 g/dL Final     WBC   Date " Value Ref Range Status   10/04/2024 12.26 (H) 4.31 - 10.16 Thousand/uL Final   10/03/2024 12.37 (H) 4.31 - 10.16 Thousand/uL Final     Comment:     This is an appended report.  These results have been appended to a previously preliminary verified report.     Platelets   Date Value Ref Range Status   10/04/2024 143 (L) 149 - 390 Thousands/uL Final   10/03/2024 108 (L) 149 - 390 Thousands/uL Final     Comment:     Results verified by repeat     Creatinine   Date Value Ref Range Status   10/04/2024 0.48 (L) 0.60 - 1.30 mg/dL Final     Comment:     Standardized to IDMS reference method   10/03/2024 0.44 (L) 0.60 - 1.30 mg/dL Final     Comment:     Standardized to IDMS reference method     AST   Date Value Ref Range Status   10/02/2024 15 13 - 39 U/L Final   10/01/2024 21 13 - 39 U/L Final     ALT   Date Value Ref Range Status   10/02/2024 8 7 - 52 U/L Final     Comment:     Specimen collection should occur prior to Sulfasalazine administration due to the potential for falsely depressed results.    10/01/2024 10 7 - 52 U/L Final     Comment:     Specimen collection should occur prior to Sulfasalazine administration due to the potential for falsely depressed results.           Ashley Strauss MD  10/4/2024  7:57 AM

## 2024-10-04 NOTE — ASSESSMENT & PLAN NOTE
S/p stat 1LTCS for fetal malpresentation at 41w complicated by retroperitoneal hemorrhage requiring take back for  ex lap, supracervical hysterectomy, left hypogastric artery ligation and  hysterectomy.   Management per OB primary team  Voiding spontaneously, s/p IV Lasix 4 doses   Incision c/d/I  FEN: tolerating clear liquids, advance as tolerated  DVT PPX: SCDs, Lovenox BID  Recommend ambulation. PT/OT following.

## 2024-10-05 VITALS
HEIGHT: 62 IN | SYSTOLIC BLOOD PRESSURE: 139 MMHG | DIASTOLIC BLOOD PRESSURE: 73 MMHG | WEIGHT: 214.07 LBS | RESPIRATION RATE: 18 BRPM | BODY MASS INDEX: 39.39 KG/M2 | OXYGEN SATURATION: 98 % | HEART RATE: 97 BPM | TEMPERATURE: 98.2 F

## 2024-10-05 LAB
1OH-MIDAZOLAM UR CFM-MCNC: 1097 NG/MG CREAT
6MAM UR QL SCN: NEGATIVE NG/ML
7AMINOCLONAZEPAM UR CFM-MCNC: NOT DETECTED NG/MG CREAT
A-OH ALPRAZ UR QL CFM: NOT DETECTED NG/MG CREAT
ACCEPTABLE CREAT UR QL: 64 MG/DL
ALPRAZ/CREAT UR CFM: NOT DETECTED NG/MG CREAT
AMPHET UR QL CFM: NOT DETECTED NG/MG CREAT
AMPHET UR QL SCN: NORMAL NG/ML
AMPHETAMINES: NEGATIVE
ANION GAP SERPL CALCULATED.3IONS-SCNC: 7 MMOL/L (ref 4–13)
BARBITURATES UR QL SCN: NEGATIVE NG/ML
BENZODIAZ UR QL SCN: NORMAL NG/ML
BENZODIAZEPINES: ABNORMAL
BUN SERPL-MCNC: 9 MG/DL (ref 5–25)
BUPRENORPHINE UR QL CFM: NEGATIVE NG/ML
CALCIUM SERPL-MCNC: 8.2 MG/DL (ref 8.4–10.2)
CANNABINOIDS UR QL SCN: NEGATIVE NG/ML
CARISOPRODOL UR QL: NEGATIVE NG/ML
CHLORIDE SERPL-SCNC: 109 MMOL/L (ref 96–108)
CLONAZEPAM/CREAT UR CFM: NOT DETECTED NG/MG CREAT
CO2 SERPL-SCNC: 24 MMOL/L (ref 21–32)
COCAINE+BZE UR QL SCN: NEGATIVE NG/ML
CODEINE UR QL CFM: NOT DETECTED NG/MG CREAT
CREAT SERPL-MCNC: 0.46 MG/DL (ref 0.6–1.3)
DHC UR CFM-MCNC: NOT DETECTED NG/MG CREAT
DIAZEPAM/CREAT UR: NOT DETECTED NG/MG CREAT
ERYTHROCYTE [DISTWIDTH] IN BLOOD BY AUTOMATED COUNT: 16.3 % (ref 11.6–15.1)
ETHYL GLUCURONIDE UR QL SCN: NEGATIVE NG/ML
FENTANYL UR QL SCN: NEGATIVE NG/ML
FLUNITRAZEPAM UR-MCNC: NOT DETECTED NG/MG CREAT
GABAPENTIN SERPLBLD QL SCN: NEGATIVE UG/ML
GFR SERPL CREATININE-BSD FRML MDRD: 130 ML/MIN/1.73SQ M
GLUCOSE SERPL-MCNC: 84 MG/DL (ref 65–140)
HCT VFR BLD AUTO: 26.2 % (ref 34.8–46.1)
HGB BLD-MCNC: 8.6 G/DL (ref 11.5–15.4)
HYDROCODONE UR QL CFM: NOT DETECTED NG/MG CREAT
HYDROMORPHONE UR QL CFM: 3969 NG/MG CREAT
LORAZEPAM UR CFM-MCNC: NOT DETECTED NG/MG CREAT
MCH RBC QN AUTO: 28.6 PG (ref 26.8–34.3)
MCHC RBC AUTO-ENTMCNC: 32.8 G/DL (ref 31.4–37.4)
MCV RBC AUTO: 87 FL (ref 82–98)
MDMA UR QL CFM: NOT DETECTED NG/MG CREAT
MDMA UR QL CFM: NOT DETECTED NG/MG CREAT
METHADONE UR QL SCN: NEGATIVE NG/ML
METHAMPHET UR QL CFM: NOT DETECTED NG/MG CREAT
MIDAZOLAM/CREAT UR CFM: NOT DETECTED NG/MG CREAT
MORPHINE UR QL CFM: NOT DETECTED NG/MG CREAT
NITRITE UR QL STRIP: NEGATIVE UG/ML
NORCODEINE/CREAT UR CFM: NOT DETECTED NG/MG CREAT
NORDIAZEPAM UR CFM-MCNC: NOT DETECTED NG/MG CREAT
NORFLUNITRAZEPAM UR-MCNC: NOT DETECTED NG/MG CREAT
NORHYDROCODONE UR CFM-MCNC: NOT DETECTED NG/MG CREAT
NORMORPHINE: NOT DETECTED NG/MG CREAT
OH-ETHYLFLURAZ UR CFM-MCNC: NOT DETECTED NG/MG CREAT
OH-TRIAZOLAM UR CFM-MCNC: NOT DETECTED NG/MG CREAT
OPIATE CLASS: ABNORMAL
OPIATES UR QL SCN: NORMAL NG/ML
OXAZEPAM CTO UR CFM-MCNC: NOT DETECTED NG/MG CREAT
OXYCODONE+OXYMORPHONE UR QL SCN: NEGATIVE NG/ML
PCP UR QL SCN: NEGATIVE NG/ML
PLATELET # BLD AUTO: 201 THOUSANDS/UL (ref 149–390)
PMV BLD AUTO: 9.7 FL (ref 8.9–12.7)
POTASSIUM SERPL-SCNC: 3.8 MMOL/L (ref 3.5–5.3)
PROPOXYPH UR QL SCN: NEGATIVE NG/ML
RBC # BLD AUTO: 3.01 MILLION/UL (ref 3.81–5.12)
SODIUM SERPL-SCNC: 140 MMOL/L (ref 135–147)
SPECIMEN PH ACCEPTABLE UR: 6.6 (ref 4.5–8.9)
TAPENTADOL UR QL SCN: NEGATIVE NG/ML
TEMAZEPAM UR CFM-MCNC: NOT DETECTED NG/MG CREAT
TRAMADOL UR QL SCN: NEGATIVE NG/ML
WBC # BLD AUTO: 8.57 THOUSAND/UL (ref 4.31–10.16)

## 2024-10-05 PROCEDURE — 85027 COMPLETE CBC AUTOMATED: CPT

## 2024-10-05 PROCEDURE — 99024 POSTOP FOLLOW-UP VISIT: CPT | Performed by: OBSTETRICS & GYNECOLOGY

## 2024-10-05 PROCEDURE — 80048 BASIC METABOLIC PNL TOTAL CA: CPT

## 2024-10-05 RX ORDER — CALCIUM CARBONATE 500 MG/1
1000 TABLET, CHEWABLE ORAL DAILY PRN
Start: 2024-10-05

## 2024-10-05 RX ORDER — DIPHENHYDRAMINE HCL 25 MG
25 TABLET ORAL EVERY 6 HOURS PRN
Start: 2024-10-05

## 2024-10-05 RX ORDER — DOCUSATE SODIUM 100 MG/1
100 CAPSULE, LIQUID FILLED ORAL 2 TIMES DAILY
Qty: 60 CAPSULE | Refills: 0 | Status: SHIPPED | OUTPATIENT
Start: 2024-10-05

## 2024-10-05 RX ORDER — IBUPROFEN 600 MG/1
600 TABLET, FILM COATED ORAL EVERY 6 HOURS SCHEDULED
Qty: 30 TABLET | Refills: 0 | Status: SHIPPED | OUTPATIENT
Start: 2024-10-05

## 2024-10-05 RX ORDER — OXYCODONE HYDROCHLORIDE 5 MG/1
5 TABLET ORAL EVERY 4 HOURS PRN
Qty: 10 TABLET | Refills: 0 | Status: SHIPPED | OUTPATIENT
Start: 2024-10-05

## 2024-10-05 RX ADMIN — IBUPROFEN 600 MG: 600 TABLET, FILM COATED ORAL at 12:46

## 2024-10-05 RX ADMIN — DOCUSATE SODIUM 100 MG: 100 CAPSULE, LIQUID FILLED ORAL at 10:19

## 2024-10-05 RX ADMIN — IBUPROFEN 600 MG: 600 TABLET, FILM COATED ORAL at 00:02

## 2024-10-05 RX ADMIN — IBUPROFEN 600 MG: 600 TABLET, FILM COATED ORAL at 05:59

## 2024-10-05 RX ADMIN — ACETAMINOPHEN 325MG 650 MG: 325 TABLET ORAL at 02:22

## 2024-10-05 NOTE — PLAN OF CARE
Problem: POSTPARTUM  Goal: Experiences normal postpartum course  Description: INTERVENTIONS:  - Monitor maternal vital signs  - Assess uterine involution and lochia  Outcome: Progressing  Goal: Appropriate maternal -  bonding  Description: INTERVENTIONS:  - Identify family support  - Assess for appropriate maternal/infant bonding   -Encourage maternal/infant bonding opportunities  - Referral to  or  as needed  Outcome: Progressing  Goal: Establishment of infant feeding pattern  Description: INTERVENTIONS:  - Assess breast/bottle feeding  - Refer to lactation as needed  Outcome: Progressing  Goal: Incision(s), wounds(s) or drain site(s) healing without S/S of infection  Description: INTERVENTIONS  - Assess and document dressing, incision, wound bed, drain sites and surrounding tissue  - Provide patient and family education  Outcome: Progressing     Problem: PAIN - ADULT  Goal: Verbalizes/displays adequate comfort level or baseline comfort level  Description: Interventions:  - Encourage patient to monitor pain and request assistance  - Assess pain using appropriate pain scale  - Administer analgesics based on type and severity of pain and evaluate response  - Implement non-pharmacological measures as appropriate and evaluate response  - Consider cultural and social influences on pain and pain management  - Notify physician/advanced practitioner if interventions unsuccessful or patient reports new pain  Outcome: Progressing     Problem: INFECTION - ADULT  Goal: Absence or prevention of progression during hospitalization  Description: INTERVENTIONS:  - Assess and monitor for signs and symptoms of infection  - Monitor lab/diagnostic results  - Monitor all insertion sites, i.e. indwelling lines, tubes, and drains  - Monitor endotracheal if appropriate and nasal secretions for changes in amount and color  - [place] appropriate cooling/warming therapies per order  - Administer medications  as ordered  - Instruct and encourage patient and family to use good hand hygiene technique  - Identify and instruct in appropriate isolation precautions for identified infection/condition  Outcome: Progressing     Problem: SAFETY ADULT  Goal: Patient will remain free of falls  Description: INTERVENTIONS:  - Educate patient/family on patient safety including physical limitations  - Instruct patient to call for assistance with activity   - Consult OT/PT to assist with strengthening/mobility   - Keep Call bell within reach  - Keep bed low and locked with side rails adjusted as appropriate  - Keep care items and personal belongings within reach  - Initiate and maintain comfort rounds  - Make Fall Risk Sign visible to staff  -- Apply yellow socks and bracelet for high fall risk patients  - Consider moving patient to room near nurses station  Outcome: Progressing  Goal: Maintain or return to baseline ADL function  Description: INTERVENTIONS:  -  Assess patient's ability to carry out ADLs; assess patient's baseline for ADL function and identify physical deficits which impact ability to perform ADLs (bathing, care of mouth/teeth, toileting, grooming, dressing, etc.)  - Assess/evaluate cause of self-care deficits   - Assess range of motion  - Assess patient's mobility; develop plan if impaired  - Assess patient's need for assistive devices and provide as appropriate  - Encourage maximum independence but intervene and supervise when necessary  - Involve family in performance of ADLs  - Assess for home care needs following discharge   - Consider OT consult to assist with ADL evaluation and planning for discharge  - Provide patient education as appropriate  Outcome: Progressing  Goal: Maintains/Returns to pre admission functional level  Description: INTERVENTIONS:  - Perform AM-PAC 6 Click Basic Mobility/ Daily Activity assessment daily.  - Set and communicate daily mobility goal to care team and patient/family/caregiver.   -  Collaborate with rehabilitation services on mobility goals if consulted  - Out of bed for toileting  - Record patient progress and toleration of activity level   Outcome: Progressing     Problem: Knowledge Deficit  Goal: Patient/family/caregiver demonstrates understanding of disease process, treatment plan, medications, and discharge instructions  Description: Complete learning assessment and assess knowledge base.  Interventions:  - Provide teaching at level of understanding  - Provide teaching via preferred learning methods  Outcome: Progressing     Problem: DISCHARGE PLANNING  Goal: Discharge to home or other facility with appropriate resources  Description: INTERVENTIONS:  - Identify barriers to discharge w/patient and caregiver  - Arrange for needed discharge resources and transportation as appropriate  - Identify discharge learning needs (meds, wound care, etc.)  - Arrange for interpretive services to assist at discharge as needed  - Refer to Case Management Department for coordinating discharge planning if the patient needs post-hospital services based on physician/advanced practitioner order or complex needs related to functional status, cognitive ability, or social support system  Outcome: Progressing     Problem: NEUROSENSORY - ADULT  Goal: Achieves stable or improved neurological status  Description: INTERVENTIONS  - Monitor and report changes in neurological status  - Monitor vital signs such as temperature, blood pressure, glucose, and any other labs ordered   - Initiate measures to prevent increased intracranial pressure  - Monitor for seizure activity and implement precautions if appropriate      Outcome: Progressing     Problem: CARDIOVASCULAR - ADULT  Goal: Maintains optimal cardiac output and hemodynamic stability  Description: INTERVENTIONS:  - Monitor I/O, vital signs and rhythm  - Monitor for S/S and trends of decreased cardiac output  - Administer and titrate ordered vasoactive medications to  optimize hemodynamic stability  - Assess quality of pulses, skin color and temperature  - Assess for signs of decreased coronary artery perfusion  - Instruct patient to report change in severity of symptoms  Outcome: Progressing     Problem: GASTROINTESTINAL - ADULT  Goal: Minimal or absence of nausea and/or vomiting  Description: INTERVENTIONS:  - Administer IV fluids if ordered to ensure adequate hydration  - Maintain NPO status until nausea and vomiting are resolved  - Nasogastric tube if ordered  - Administer ordered antiemetic medications as needed  - Provide nonpharmacologic comfort measures as appropriate  - Advance diet as tolerated, if ordered  - Consider nutrition services referral to assist patient with adequate nutrition and appropriate food choices  Outcome: Progressing  Goal: Maintains or returns to baseline bowel function  Description: INTERVENTIONS:  - Assess bowel function  - Encourage oral fluids to ensure adequate hydration  - Administer IV fluids if ordered to ensure adequate hydration  - Administer ordered medications as needed  - Encourage mobilization and activity  - Consider nutritional services referral to assist patient with adequate nutrition and appropriate food choices  Outcome: Progressing     Problem: GENITOURINARY - ADULT  Goal: Maintains or returns to baseline urinary function  Description: INTERVENTIONS:  - Assess urinary function  - Encourage oral fluids to ensure adequate hydration if ordered  - Administer IV fluids as ordered to ensure adequate hydration  - Administer ordered medications as needed  - Offer frequent toileting  - Follow urinary retention protocol if ordered  Outcome: Progressing  Goal: Urinary catheter remains patent  Description: INTERVENTIONS:  - Assess patency of urinary catheter  - If patient has a chronic talley, consider changing catheter if non-functioning  - Follow guidelines for intermittent irrigation of non-functioning urinary catheter  Outcome:  Progressing     Problem: METABOLIC, FLUID AND ELECTROLYTES - ADULT  Goal: Electrolytes maintained within normal limits  Description: INTERVENTIONS:  - Monitor labs and assess patient for signs and symptoms of electrolyte imbalances  - Administer electrolyte replacement as ordered  - Monitor response to electrolyte replacements, including repeat lab results as appropriate  - Instruct patient on fluid and nutrition as appropriate  Outcome: Progressing     Problem: SKIN/TISSUE INTEGRITY - ADULT  Goal: Incision(s), wounds(s) or drain site(s) healing without S/S of infection  Description: INTERVENTIONS  - Assess and document dressing, incision, wound bed, drain sites and surrounding tissue  - Provide patient and family education  Outcome: Progressing     Problem: MUSCULOSKELETAL - ADULT  Goal: Maintain or return mobility to safest level of function  Description: INTERVENTIONS:  - Assess patient's ability to carry out ADLs; assess patient's baseline for ADL function and identify physical deficits which impact ability to perform ADLs (bathing, care of mouth/teeth, toileting, grooming, dressing, etc.)  - Assess/evaluate cause of self-care deficits   - Assess range of motion  - Assess patient's mobility  - Assess patient's need for assistive devices and provide as appropriate  - Encourage maximum independence but intervene and supervise when necessary  - Involve family in performance of ADLs  - Assess for home care needs following discharge   - Consider OT consult to assist with ADL evaluation and planning for discharge  - Provide patient education as appropriate  Outcome: Progressing     Problem: INFECTION - ADULT  Goal: Absence of fever/infection during neutropenic period  Description: INTERVENTIONS:  - Monitor WBC    Outcome: Completed

## 2024-10-05 NOTE — LACTATION NOTE
This note was copied from a baby's chart.  Discharge Lactation    Met with Buffy who is formula feeding and breastfeeding her baby girl and both are anticipating discharge home today. Buffy states baby has been receiving bottles because of the the difficult delivery and subsequent PPH (QBL 1703). Buffy states she was set up pumping yesterday but has not pumped since because of uterine pain while pumping.     Encouraged attempting latching baby, following with paced bottle feeding and then pumping 10-15 minutes if baby does not latch. Reviewed paced bottle feeding and pumping. Reviewed the Ready Set Baby and the Discharge Breastfeeding Booklets briefly. Encouraged calling the [facility] for Lactation support and consultation. Buffy has no questions at this time.

## 2024-10-05 NOTE — PROGRESS NOTES
PRISCILA Nogueira, ALEJANDRA    [facility] for [facility]    I was present with the CN student who participated in the documentation of the services provided. I have verified the history and personally performed the physical exam and medical decision making, as documented by the student and edited by me.     Carolyn Pinto, ANTONI Student    07/07/22        Progress Note - OB/GYN  Buffy Pope 34 y.o. female MRN: [id number]  Unit/Bed#: -01 Encounter: [id number]    Assessment and Plan     Buffy Pope is a patient of: [facility] . She is a 34-year-old  POD#3 from emergent  delivery followed by return to the OR for retroperitoneal and broad ligament hematoma and hemodynamic instability who then underwent ex lap, supracervical hyst, ligation of the left internal iliac artery and cystoscopy.  Recovering well and is stable.      Acute blood loss anemia  Assessment & Plan  Hemoglobin stable, consider less frequent lab draws       Results from last 7 days   Lab Units 10/05/24  0551 10/04/24  0441 10/03/24  1831 10/03/24  0927 10/03/24  0450 10/02/24  1938 10/02/24  1451 10/02/24  1105   HEMOGLOBIN g/dL 8.6* 8.8* 10.9* 9.6* 9.3* 9.6* 7.9* 7.1*   HEMATOCRIT % 26.2* 26.3* 32.4* 28.1* 27.5* 27.8*  --  20.9*        Postpartum hemorrhage  Assessment & Plan  Total QBL during CS = 1703 mL   Secondary to expanding left retroperitoneal/broad ligament hematoma and severe uterine atony s/p methergine, hemabate, rectal cytotec, TXA, and Allyson intrauterine device followed by hysterectomy due to persistent atony  EBL during ex lap = 2L  Total blood products to date: 9u pRBC, 4u FFP, 1 pack platelets     No prenatal care in current pregnancy  Assessment & Plan  CM consulted    Status post primary low transverse  section  Assessment & Plan   doing well  Routine postpartum care     * Status post abdominal supracervical hysterectomy  Assessment & Plan  Patient now transferred back and stable on L&D floor.  Hgb stable at 9.6  Tachycardia resolved  Abdomen remains distended but soft, tolerating regular diet  S/p lasix, voiding spontaneously  Continue close monitoring of I/Os  Pain: sched Motrin/Tylenol, Winnie 5/10.  Out of bed to chair, ambulation with assistance.   VTE prophylaxis: SCDs, ambulation and Lovenox 40mg qd    Anticipate discharge  "today        Disposition    - Anticipate discharge home on POD# 4-5      Subjective/Objective     Chief Complaint: Postpartum State     Subjective:    Buffy Pope is POD#4 from emergent  delivery followed by return to the OR for retroperitoneal and broad ligament hematoma and hemodynamic instability who then underwent ex lap, supracervical hyst, ligation of the left internal iliac artery and cystoscopy. She has no current complaints.  Pain is improved with medications.  Patient is currently voiding.  She is ambulating.  Patient is currently passing flatus but  has had no bowel movement. She is tolerating a regular diet, and denies nausea or vomiting. Patient denies fever, chills, chest pain, shortness of breath, or calf tenderness. She is  Using breast pump. She is recovering well and is stable.     Vitals:   /78 (BP Location: Left arm)   Pulse 84   Temp 98.5 °F (36.9 °C) (Oral)   Resp 18   Ht 5' 2\" (1.575 m)   Wt 97.1 kg (214 lb 1.1 oz)   LMP 12/15/2023 (Approximate)   SpO2 97%   Breastfeeding Yes   BMI 39.15 kg/m²     No intake or output data in the 24 hours ending 10/05/24 0856      Invasive Devices       Peripheral Intravenous Line  Duration             Peripheral IV 10/01/24 Left Antecubital 4 days    Peripheral IV 10/01/24 Right Forearm 4 days                    Physical Exam:   GEN: Buffy Pope appears well, alert and oriented x 3, pleasant and cooperative   CARDIO: RRR, no murmurs or rubs  RESP:  CTAB, no wheezes or rales  ABDOMEN: soft, no tenderness, no distention, T incision C/D/I  EXTREMITIES: SCDs on, non tender, no erythema      Labs:     Hemoglobin   Date Value Ref Range Status   10/05/2024 8.6 (L) 11.5 - 15.4 g/dL Final   10/04/2024 8.8 (L) 11.5 - 15.4 g/dL Final     WBC   Date Value Ref Range Status   10/05/2024 8.57 4.31 - 10.16 Thousand/uL Final   10/04/2024 12.26 (H) 4.31 - 10.16 Thousand/uL Final     Platelets   Date Value Ref Range Status   10/05/2024 201 149 - " 390 Thousands/uL Final   10/04/2024 143 (L) 149 - 390 Thousands/uL Final     Creatinine   Date Value Ref Range Status   10/05/2024 0.46 (L) 0.60 - 1.30 mg/dL Final     Comment:     Standardized to IDMS reference method   10/04/2024 0.48 (L) 0.60 - 1.30 mg/dL Final     Comment:     Standardized to IDMS reference method     AST   Date Value Ref Range Status   10/02/2024 15 13 - 39 U/L Final   10/01/2024 21 13 - 39 U/L Final     ALT   Date Value Ref Range Status   10/02/2024 8 7 - 52 U/L Final     Comment:     Specimen collection should occur prior to Sulfasalazine administration due to the potential for falsely depressed results.    10/01/2024 10 7 - 52 U/L Final     Comment:     Specimen collection should occur prior to Sulfasalazine administration due to the potential for falsely depressed results.           Meghan Perla MD  10/5/2024  8:56 AM

## 2024-10-05 NOTE — ASSESSMENT & PLAN NOTE
Patient now transferred back and stable on L&D floor.  Hgb stable at 9.6  Tachycardia resolved  Abdomen remains distended but soft, tolerating regular diet  S/p lasix, voiding spontaneously  Continue close monitoring of I/Os  Pain: sched Motrin/Tylenol, Winnie 5/10.  Out of bed to chair, ambulation with assistance.   VTE prophylaxis: SCDs, ambulation and Lovenox 40mg qd    Anticipate discharge today

## 2024-10-05 NOTE — ASSESSMENT & PLAN NOTE
Hemoglobin stable, consider less frequent lab draws       Results from last 7 days   Lab Units 10/05/24  0551 10/04/24  0441 10/03/24  1831 10/03/24  0927 10/03/24  0450 10/02/24  1938 10/02/24  1451 10/02/24  1105   HEMOGLOBIN g/dL 8.6* 8.8* 10.9* 9.6* 9.3* 9.6* 7.9* 7.1*   HEMATOCRIT % 26.2* 26.3* 32.4* 28.1* 27.5* 27.8*  --  20.9*

## 2024-10-07 PROCEDURE — 88307 TISSUE EXAM BY PATHOLOGIST: CPT | Performed by: PATHOLOGY

## 2024-10-07 PROCEDURE — 88304 TISSUE EXAM BY PATHOLOGIST: CPT | Performed by: PATHOLOGY

## 2024-10-09 ENCOUNTER — OFFICE VISIT (OUTPATIENT)
Dept: OBGYN CLINIC | Facility: CLINIC | Age: 34
End: 2024-10-09

## 2024-10-09 VITALS
SYSTOLIC BLOOD PRESSURE: 120 MMHG | HEIGHT: 62 IN | WEIGHT: 194 LBS | DIASTOLIC BLOOD PRESSURE: 78 MMHG | BODY MASS INDEX: 35.7 KG/M2

## 2024-10-09 DIAGNOSIS — R60.0 LOWER EXTREMITY EDEMA: Primary | ICD-10-CM

## 2024-10-09 PROCEDURE — 99024 POSTOP FOLLOW-UP VISIT: CPT | Performed by: STUDENT IN AN ORGANIZED HEALTH CARE EDUCATION/TRAINING PROGRAM

## 2024-10-09 NOTE — PROGRESS NOTES
Postpartum Office Visit    Patient Name: Buffy Pope  Patient MRN:  [id number]  Date:  10/09/24   : 1990  Service: Ob/Gyn      Assessment & Plan     Buffy Pope is a 34 y.o.  female  Presents for postpartum visit. POD 8 from pLTCS complicated by retroperitoneal hematoma requiring takeback to OR for hypogastric artery ligation and eventually c-hysterectomy due to hemorrhage and atony.  - incision clean, dry, well approximated, no erythema or drainage noted   - had unilateral left leg pain and swelling that spontaneously resolved last week, now asymptomatic. Given high risk for DVT/PE due to hemorrhage and major surgery, duplex US was ordered today   - Return to office in 3 weeks for follow up     Problem List       Status post primary low transverse  section    No prenatal care in current pregnancy    Status post abdominal supracervical hysterectomy    Postpartum hemorrhage    Acute blood loss anemia     Other Visit Diagnoses       Lower extremity edema    -  Primary            Subjective    Buffy Pope is a 34 y.o.  female seen today in clinic for postpartum visit.     POD 8 from pLTCS complicated by retroperitoneal hematoma requiring takeback to OR for hypogastric artery ligation and eventually c-hysterectomy due to hemorrhage and atony. Post op course included ICU admission with transfusion of multiple blood products. Hgb on day of discharge 8.6.     Today she reports pain is well controlled, takes van PRN. Has had mild constipation associated with van use. She has not been breastfeeding due to concern for interaction of PO meds with lactation.     She has normal bowel and bladder movements and is tolerating PO.  Last week she had unilateral left leg pain and swelling that has since resolved.   She denies chest pain, SOB.       Vaginal bleeding: denies   Bowel movement: normal   Breastfeeding: no- would like to start   Pain control: good         Review of Systems  A  "10 point review of systems was performed and was otherwise unrevealing for pertinent positives or negatives except as noted above.      Objective     /78 (BP Location: Left arm, Patient Position: Sitting, Cuff Size: Standard)   Ht 5' 2\" (1.575 m)   Wt 88 kg (194 lb)   LMP 12/15/2023 (Approximate)   Breastfeeding No   BMI 35.48 kg/m²      Physical Exam  Constitutional:       Appearance: Normal appearance. She is well-developed. She is not ill-appearing or diaphoretic.   HENT:      Head: Normocephalic and atraumatic.   Eyes:      Extraocular Movements: Extraocular movements intact.      Conjunctiva/sclera: Conjunctivae normal.   Cardiovascular:      Rate and Rhythm: Normal rate.   Abdominal:      General: There is no distension.      Tenderness: There is no abdominal tenderness. There is no guarding or rebound.      Comments: Incision: T incision Clean, Dry, Intact, well approximated, nontender, no drainage or erythema noted    Musculoskeletal:      Cervical back: Normal range of motion and neck supple.   Neurological:      Mental Status: She is alert and oriented to person, place, and time.      Motor: Motor function is intact.   Psychiatric:         Mood and Affect: Mood and affect normal.                  Judy Montes MD 10/09/24 12:08 PM    "

## 2024-10-15 NOTE — DISCHARGE SUMMARY
Discharge Summary - OB/GYN   Name: Buffy Pope 34 y.o. female I MRN: [id number]  Unit/Bed#: -01 I Date of Admission: 10/1/2024   Date of Service: 10/15/2024 I Hospital Day: 4  { ?Quick Links I Problem List I PORCH I Billing Tip:02734}  ADMISSION  Patient of: [facility]   Admission Date: 10/1/2024   Admitting Attending: Dr. Montes  Admitting Diagnoses:   Patient Active Problem List   Diagnosis    Status post primary low transverse  section    No prenatal care in current pregnancy    Status post abdominal supracervical hysterectomy    Postpartum hemorrhage    Acute blood loss anemia       DELIVERY  Delivery Method: , Low Transverse   Delivery Date and Time: 10/1/2024 3:15 AM  Delivery Attending:  Dr. Montes    DISCHARGE  Discharge Date: 10/5/2024  Discharge Attending: Dr. Fisher  Discharge Diagnosis:   Same, Delivered  Abdominal supracervical hysterectomy  Postpartum hemorrhage  Acute blood loss anemia  Clinical course: Admission to Delivery  Buffy Pope is a 34 y.o.  who was admitted at 41w4d for labor. She initially presented to [facility] ED for labor and was noted to have ROM on exam with suspicion for cord prolapse at that time. FHTs were confirmed to be 150s-180s. She was emergently transferred to [facility]. She was given azithromycin and ancef en route to [facility]. On initial evaluation at [facility], she was noted to have fetal arm in the vagina. Her pregnancy was complicated by no prenatal care. She was urgently moved to the OR for  delivery.      delivery was complicated by difficult fetal extraction, left uterine extension skeletonizing the left uterine artery, and 4cm hematoma. Shortly after completion of her  delivery, she became hypotensive in PACU. Concern symptoms were likely secondary to PPH but also low threshold for CT angio due to 4cm hematoma***    Delivery  Route of Delivery: , Low Transverse  ***Reason for   delivery:   malpresentation    Anesthesia: Spinal,   QBL:        QBL:        Delivery: , Low Transverse at 10/1/2024 3:15 AM    Baby's Weight: 4940 g (10 lb 14.3 oz); 174.25    Apgar scores: 3  and 9  at 1 and 5 minutes, respectively    Clinical Course: Post-Delivery:  The post delivery course was {remarkable/unremarkable:38516}.    On the day of discharge, the patient was ambulating, voiding spontaneously, tolerating oral intake, and hemodynamically stable. She was able to reasonably perform all ADLs. She had appropriate bowel function. Pain was well-controlled. She was discharged home on postpartum/postop day #*** without complications***. Patient was instructed to follow up with her OB as an outpatient and was given appropriate warnings to call her provider with problems or concerns.    Pertinent lab findings included:   Blood type O+.     Last three Hgb values:  Lab Results   Component Value Date    HGB 8.6 (L) 10/05/2024    HGB 8.8 (L) 10/04/2024    HGB 10.9 (L) 10/03/2024        Problem-specific follow-up plans included the following:  Assessment & Plan  Status post abdominal supracervical hysterectomy  Patient now transferred back and stable on L&D floor.  Hgb stable at 9.6  Tachycardia resolved  Abdomen remains distended but soft, tolerating regular diet  S/p lasix, voiding spontaneously  Continue close monitoring of I/Os  Pain: sched Motrin/Tylenol, Winnie 5/10.  Out of bed to chair, ambulation with assistance.   VTE prophylaxis: SCDs, ambulation and Lovenox 40mg qd    Anticipate discharge today  Postpartum hemorrhage  Total QBL during CS = 1703 mL   Secondary to expanding left retroperitoneal/broad ligament hematoma and severe uterine atony s/p methergine, hemabate, rectal cytotec, TXA, and Allyson intrauterine device followed by hysterectomy due to persistent atony  EBL during ex lap = 2L  Total blood products to date: 9u pRBC, 4u FFP, 1 pack platelets   Status post primary low transverse   section   doing well  Routine postpartum care   No prenatal care in current pregnancy  CM consulted  Acute blood loss anemia  Hemoglobin stable, consider less frequent lab draws                Discharge med list:  Contraception: sterilization     Medication List      START taking these medications     calcium carbonate 500 mg chewable tablet; Commonly known as: TUMS; Chew   2 tablets (1,000 mg total) daily as needed for indigestion or heartburn   diphenhydrAMINE 25 mg tablet; Commonly known as: BENADRYL; Take 1 tablet   (25 mg total) by mouth every 6 (six) hours as needed for itching (Itching)   docusate sodium 100 mg capsule; Commonly known as: COLACE; Take 1   capsule (100 mg total) by mouth 2 (two) times a day   ibuprofen 600 mg tablet; Commonly known as: MOTRIN; Take 1 tablet (600   mg total) by mouth every 6 (six) hours   oxyCODONE 5 immediate release tablet; Commonly known as: Roxicodone;   Take 1 tablet (5 mg total) by mouth every 4 (four) hours as needed for   severe pain for up to 10 doses Max Daily Amount: 30 mg       Condition at discharge:   good     Disposition:   Home    Planned Readmission:   Natalia Liu MD  OBGYN PGY-4  10/17/24 5:55 PM

## 2024-10-16 ENCOUNTER — TELEPHONE (OUTPATIENT)
Dept: OBGYN CLINIC | Facility: CLINIC | Age: 34
End: 2024-10-16

## 2024-10-16 NOTE — TELEPHONE ENCOUNTER
----- Message from Elise CAMPOVERDE sent at 10/16/2024 10:14 AM EDT -----  Regarding: RE: 3 Week Follow Up Appointment  Patient confirmed appt.  ----- Message -----  From: Elise Heller MA  Sent: 10/11/2024   9:38 AM EDT  To: Shelli Mendes; #  Subject: RE: 3 Week Follow Up Appointment                 Called patient and LM requesting a call back to confirm her appointment.  ----- Message -----  From: Shelli Mendes  Sent: 10/9/2024   9:32 AM EDT  To: [facility]; #  Subject: 3 Week Follow Up Appointment                     Patient needs a 3 week follow up appointment with Dr. Montes. (The week of oct 28th-nov 1st) Patient is only [place] speaking. Nothing available pat. Will continue to monitor schedule for any cancellations.

## 2024-10-21 ENCOUNTER — HOSPITAL ENCOUNTER (OUTPATIENT)
Dept: VASCULAR ULTRASOUND | Facility: HOSPITAL | Age: 34
Discharge: HOME | End: 2024-10-21

## 2024-10-21 DIAGNOSIS — R60.0 LOWER EXTREMITY EDEMA: ICD-10-CM

## 2024-10-21 PROCEDURE — 93970 EXTREMITY STUDY: CPT

## 2024-10-21 PROCEDURE — 93970 EXTREMITY STUDY: CPT | Performed by: SURGERY

## 2024-10-22 ENCOUNTER — NURSE TRIAGE (OUTPATIENT)
Age: 34
End: 2024-10-22

## 2024-10-22 NOTE — TELEPHONE ENCOUNTER
"Patient calling in stating that she delivered on 10/1/24, via , and pt stating that her  incision is getting infected. Pt stating that the incision is open, there is white/pink color, redness around and discharge, and odor and pain 8/10 and bleeding from incision site as well as feeling hot and burning sensation. Pt stating that this started 2 days ago. Pt denies fever at this time. Pt reporting having weakness, and chills and pt started taking oxycodone and ibuprofen        Patient is advised that as per protocol, it is advised to go to the emergency room for further evaluation. Pt was advised to go to [facility]. Pt verbalized understanding, pt stated that she will be going to [facility]. Pt stated taht she's unsure when she will arrive as she has 2 kids and needs to arrive childcare, pt was notified that it is advised to leave now, pt verbalized understanding.       [employer] Secure Chat sent to Dr Tillman   [employer] Secure Chat received: agree with ED dimitri farr given no in house ob/gyn at [facility]        used for this call #[phone]              Reason for Disposition   SEVERE pain in the incision    Answer Assessment - Initial Assessment Questions  1. SYMPTOM: \"What's the main symptom you're concerned about?\" (e.g., drainage, incision opened up, pain, redness)      . Pt stating that the incision is open, there is white/pink color and discharge, and odor.   2. ONSET: \"When did symptoms  start?\"      2 days ago but worsening   3. DATE of : \"When was the  performed?\"       1/10/24  4. REDNESS: \"Is there any redness at the incision site?\" If Yes, ask: \"How wide across is the redness?\" (inches, centimeters)       Yes and discharge   5. PAIN: \"Is there any pain?\" If Yes, ask: \"How bad is it?\"  (Scale 1-10; or mild, moderate, severe)      8/10  6. BLEEDING: \"Is there any bleeding?\" If Yes, ask: \"How much?\" and \"Where?\" (e.g., incision site, " "vaginal)       Pt stating that there is slight bleeding from the incision   7. DRAINAGE: \"Is there any drainage from the incision site?\" If Yes, ask: \"What color and how much?\" (e.g., red, cloudy, pus; drops, teaspoon)      Yes, red/white in color   8. FEVER: \"Do you have a fever?\" If Yes, ask: \"What is your temperature, how was it measured, and when did it start?\"      Pt denies fevers at this time.  9. OTHER SYMPTOMS: \"Do you have any other symptoms?\" (e.g., rash elsewhere on body, shaking chills, weakness)      Pt reporting having weakness, and chills and pt started taking oxycodone and ibuprofen    Protocols used: Postpartum -  Incision Symptoms-Adult-AH    " Skin normal color for race, warm, dry and intact. No evidence of rash.

## 2024-10-23 ENCOUNTER — TELEPHONE (OUTPATIENT)
Age: 34
End: 2024-10-23

## 2024-10-23 NOTE — TELEPHONE ENCOUNTER
# [phone]    Left non-detailed voicemail message for patient to return our call.  
----- Message from Judy Montes MD sent at 10/22/2024  5:30 PM EDT -----  Normal dopplers- please call patient and inform her of normal results.   
Yes...

## 2024-10-25 NOTE — TELEPHONE ENCOUNTER
Never arrived  Can someone please call to check-in and contact doctor on call for [facility] if any concerns?  thanks

## 2024-11-01 ENCOUNTER — OFFICE VISIT (OUTPATIENT)
Dept: OBGYN CLINIC | Facility: CLINIC | Age: 34
End: 2024-11-01

## 2024-11-01 VITALS
DIASTOLIC BLOOD PRESSURE: 80 MMHG | BODY MASS INDEX: 33.86 KG/M2 | SYSTOLIC BLOOD PRESSURE: 118 MMHG | HEIGHT: 62 IN | WEIGHT: 184 LBS

## 2024-11-01 DIAGNOSIS — T81.49XA INCISIONAL INFECTION: ICD-10-CM

## 2024-11-01 DIAGNOSIS — Z09 POSTOPERATIVE EXAMINATION: ICD-10-CM

## 2024-11-01 DIAGNOSIS — Z90.710 STATUS POST HYSTERECTOMY: ICD-10-CM

## 2024-11-01 DIAGNOSIS — Z98.891 STATUS POST CESAREAN SECTION: Primary | ICD-10-CM

## 2024-11-01 PROCEDURE — 99024 POSTOP FOLLOW-UP VISIT: CPT | Performed by: STUDENT IN AN ORGANIZED HEALTH CARE EDUCATION/TRAINING PROGRAM

## 2024-11-01 RX ORDER — CEPHALEXIN 500 MG/1
500 CAPSULE ORAL EVERY 6 HOURS SCHEDULED
Qty: 28 CAPSULE | Refills: 0 | Status: SHIPPED | OUTPATIENT
Start: 2024-11-01 | End: 2024-11-08

## 2024-11-01 NOTE — PROGRESS NOTES
Postpartum Visit  MD Layne    24      Cindy Pope is a 34 y.o.  female who presents for a postpartum visit.         Term 10/01/24   4940 g (10 lb 14.3 oz) F CS-LTranv Spinal  Living 3 9    Name: Tami Beauchamp   Complications: Malpresentation of fetus   Location: [facility] (AN L&D)          incision: not healed completely at junction of pfannensteil and midline vertical incision      Lochia is scant--discharge, no bleeding  Bowel function is normal.   Bladder function is normal.   Patient has not been sexually active.   Desired contraception method: s/p hysterectomy    Postpartum Depression: Low Risk  (2024)    [place]  Depression Scale     Last EPDS Total Score: 0     Last EPDS Self Harm Result: Never                Current Outpatient Medications:     docusate sodium (COLACE) 100 mg capsule, Take 1 capsule (100 mg total) by mouth 2 (two) times a day, Disp: 60 capsule, Rfl: 0    ibuprofen (MOTRIN) 600 mg tablet, Take 1 tablet (600 mg total) by mouth every 6 (six) hours, Disp: 30 tablet, Rfl: 0    calcium carbonate (TUMS) 500 mg chewable tablet, Chew 2 tablets (1,000 mg total) daily as needed for indigestion or heartburn (Patient not taking: Reported on 10/9/2024), Disp: , Rfl:     diphenhydrAMINE (BENADRYL) 25 mg tablet, Take 1 tablet (25 mg total) by mouth every 6 (six) hours as needed for itching (Itching) (Patient not taking: Reported on 10/9/2024), Disp: , Rfl:     oxyCODONE (Roxicodone) 5 immediate release tablet, Take 1 tablet (5 mg total) by mouth every 4 (four) hours as needed for severe pain for up to 10 doses Max Daily Amount: 30 mg (Patient not taking: Reported on 2024), Disp: 10 tablet, Rfl: 0    No Known Allergies    Review of Systems  Constitutional: no fever, feels well  Breasts: no complaints of breast pain, breast lump, or nipple discharge  Gastrointestinal: no complaints nausea, vomiting  Genitourinary: as  "noted in HPI.  Neurological: no complaints of headache    Objective      /80 (BP Location: Left arm, Patient Position: Sitting, Cuff Size: Standard)   Ht 5' 2\" (1.575 m)   Wt 83.5 kg (184 lb)   LMP 12/15/2023 (Approximate)   Breastfeeding No   BMI 33.65 kg/m²   Physical Exam  Constitutional:       Appearance: Normal appearance.   HENT:      Head: Normocephalic.   Eyes:      Extraocular Movements: Extraocular movements intact.      Conjunctiva/sclera: Conjunctivae normal.   Abdominal:      General: There is no distension.      Palpations: Abdomen is soft.      Tenderness: There is no guarding.      Comments: Superficial incisional erythema, purulent discharge, ttp  Glue removed, incision cleaned with sterile saline and gauze, incision probed with q-tip and confirmed, not open to subQ, unable to touch fascia   Neurological:      Mental Status: She is alert.           Assessment/Plan:  Buffy Pope is a 34 y.o. who is postpartum from a CS c/b take back for ex-lap, supracervical hysterectomy with a postpartum examination concerning for incisional infection    1. Status post  section  - cephalexin (KEFLEX) 500 mg capsule; Take 1 capsule (500 mg total) by mouth every 6 (six) hours for 7 days  Dispense: 28 capsule; Refill: 0    2. Postoperative examination    3. Status post hysterectomy    4. Incisional infection  - cephalexin (KEFLEX) 500 mg capsule; Take 1 capsule (500 mg total) by mouth every 6 (six) hours for 7 days  Dispense: 28 capsule; Refill: 0  Incision cleaned and explored, granulation tissue visible, not open to underlying fascia  RTO in 1 week for repeat assessment  Reviewed strict call precautions: pain, bleeding, fevers, etc.    "

## 2024-11-07 ENCOUNTER — POSTPARTUM VISIT (OUTPATIENT)
Dept: OBGYN CLINIC | Facility: CLINIC | Age: 34
End: 2024-11-07

## 2024-11-07 VITALS
SYSTOLIC BLOOD PRESSURE: 122 MMHG | HEIGHT: 62 IN | BODY MASS INDEX: 34.23 KG/M2 | WEIGHT: 186 LBS | DIASTOLIC BLOOD PRESSURE: 80 MMHG

## 2024-11-07 DIAGNOSIS — Z98.891 STATUS POST PRIMARY LOW TRANSVERSE CESAREAN SECTION: ICD-10-CM

## 2024-11-07 DIAGNOSIS — Z90.711 STATUS POST ABDOMINAL SUPRACERVICAL SUBTOTAL HYSTERECTOMY: ICD-10-CM

## 2024-11-07 PROCEDURE — 99024 POSTOP FOLLOW-UP VISIT: CPT | Performed by: STUDENT IN AN ORGANIZED HEALTH CARE EDUCATION/TRAINING PROGRAM

## 2024-11-07 NOTE — PROGRESS NOTES
Postpartum Visit  MD Layne    24      Cindy Pope is a 34 y.o.  female who presents for a postpartum visit.         Term 10/01/24   4940 g (10 lb 14.3 oz) F CS-LTranv Spinal  Living 3 9    Name: Tami Beauchamp   Complications: Malpresentation of fetus   Location: [facility] (AN L&D)          incision: healing better, less discharge, no foul odor or fevers  Taking antibiotics    Lochia is scant--discharge, no bleeding  Bowel function is normal.   Bladder function is normal.   Patient has not been sexually active.   Desired contraception method: s/p hysterectomy    Postpartum Depression: Low Risk  (2024)    [place]  Depression Scale     Last EPDS Total Score: 0     Last EPDS Self Harm Result: Never                Current Outpatient Medications:     cephalexin (KEFLEX) 500 mg capsule, Take 1 capsule (500 mg total) by mouth every 6 (six) hours for 7 days, Disp: 28 capsule, Rfl: 0    ibuprofen (MOTRIN) 600 mg tablet, Take 1 tablet (600 mg total) by mouth every 6 (six) hours, Disp: 30 tablet, Rfl: 0    calcium carbonate (TUMS) 500 mg chewable tablet, Chew 2 tablets (1,000 mg total) daily as needed for indigestion or heartburn (Patient not taking: Reported on 10/9/2024), Disp: , Rfl:     diphenhydrAMINE (BENADRYL) 25 mg tablet, Take 1 tablet (25 mg total) by mouth every 6 (six) hours as needed for itching (Itching) (Patient not taking: Reported on 10/9/2024), Disp: , Rfl:     docusate sodium (COLACE) 100 mg capsule, Take 1 capsule (100 mg total) by mouth 2 (two) times a day (Patient not taking: Reported on 2024), Disp: 60 capsule, Rfl: 0    oxyCODONE (Roxicodone) 5 immediate release tablet, Take 1 tablet (5 mg total) by mouth every 4 (four) hours as needed for severe pain for up to 10 doses Max Daily Amount: 30 mg (Patient not taking: Reported on 2024), Disp: 10 tablet, Rfl: 0    No Known Allergies    Review of Systems  Per  "HPI    Objective      /80 (BP Location: Left arm, Patient Position: Sitting, Cuff Size: Standard)   Ht 5' 2\" (1.575 m)   Wt 84.4 kg (186 lb)   LMP 12/15/2023 (Approximate)   Breastfeeding Yes   BMI 34.02 kg/m²   Physical Exam  Constitutional:       Appearance: Normal appearance.   HENT:      Head: Normocephalic.   Eyes:      Extraocular Movements: Extraocular movements intact.      Conjunctiva/sclera: Conjunctivae normal.   Pulmonary:      Effort: Pulmonary effort is normal.   Abdominal:      General: There is no distension.      Palpations: Abdomen is soft.      Tenderness: There is no guarding.      Comments: Granulation tissue at incision   Musculoskeletal:         General: Normal range of motion.      Cervical back: Normal range of motion.   Skin:     General: Skin is warm and dry.   Neurological:      General: No focal deficit present.      Mental Status: She is alert.   Psychiatric:         Mood and Affect: Mood normal.         Behavior: Behavior normal.         Thought Content: Thought content normal.           Assessment/Plan:  Buffy Pope is a 34 y.o. who is postpartum from a CS c/b take back for ex-lap, supracervical hysterectomy with a postpartum examination improved from last visit  1. Postpartum care and examination        2. Status post primary low transverse  section        3. Status post abdominal supracervical hysterectomy           Healing better  Incision slowly closing, less discharge, minimal erythema  Continue antibiotics  Reviewed doppler results with her--negative, feeling better from that perspective as well  RTO for postpartum appt  "

## 2024-11-20 ENCOUNTER — POSTPARTUM VISIT (OUTPATIENT)
Dept: OBGYN CLINIC | Facility: CLINIC | Age: 34
End: 2024-11-20

## 2024-11-20 VITALS — WEIGHT: 185 LBS | SYSTOLIC BLOOD PRESSURE: 100 MMHG | BODY MASS INDEX: 33.84 KG/M2 | DIASTOLIC BLOOD PRESSURE: 70 MMHG

## 2024-11-20 DIAGNOSIS — Z90.711 STATUS POST ABDOMINAL SUPRACERVICAL SUBTOTAL HYSTERECTOMY: ICD-10-CM

## 2024-11-20 DIAGNOSIS — Z98.891 STATUS POST PRIMARY LOW TRANSVERSE CESAREAN SECTION: Primary | ICD-10-CM

## 2024-11-20 PROCEDURE — 99024 POSTOP FOLLOW-UP VISIT: CPT | Performed by: NURSE PRACTITIONER

## 2024-11-20 NOTE — PROGRESS NOTES
Assessment/Plan:      Diagnoses and all orders for this visit:    Status post primary low transverse  section    Status post abdominal supracervical hysterectomy    Postpartum care and examination          RTO in 2 weeks for one last check of the incision and postpartum exam.     Subjective:     Patient ID: Buffy Pope is a 34 y.o. female . She is s/p primary C/S and abdominal supracervical hysterectomy on 10/1/2024.  She is here for follow-up of incision. She was seen last week and was noted to have an area of granulation tissue at the incision. She completed her course of antibiotics. She is feeling well. Denies fevers, chills. States incision site looks better.     Visit Vitals  /70   Wt 83.9 kg (185 lb)   LMP 12/15/2023 (Approximate)   Breastfeeding Yes   BMI 33.84 kg/m²   OB Status Recent pregnancy   Smoking Status Never   BSA 1.85 m²     Review of Systems    As indicated in HPI. All other ROS negative.     Objective:     Physical Exam  Vitals and nursing note reviewed.   Constitutional:       Appearance: Normal appearance.   HENT:      Head: Normocephalic and atraumatic.   Abdominal:      General: Abdomen is flat.      Palpations: Abdomen is soft.       Musculoskeletal:      Cervical back: Neck supple.   Skin:     General: Skin is warm.   Neurological:      Mental Status: She is alert.   Psychiatric:         Mood and Affect: Mood normal.

## 2024-12-16 ENCOUNTER — POSTPARTUM VISIT (OUTPATIENT)
Dept: OBGYN CLINIC | Facility: CLINIC | Age: 34
End: 2024-12-16

## 2024-12-16 VITALS
SYSTOLIC BLOOD PRESSURE: 108 MMHG | BODY MASS INDEX: 34.78 KG/M2 | WEIGHT: 189 LBS | DIASTOLIC BLOOD PRESSURE: 80 MMHG | HEIGHT: 62 IN

## 2024-12-16 DIAGNOSIS — Z90.711 STATUS POST ABDOMINAL SUPRACERVICAL SUBTOTAL HYSTERECTOMY: ICD-10-CM

## 2024-12-16 PROCEDURE — 99024 POSTOP FOLLOW-UP VISIT: CPT | Performed by: STUDENT IN AN ORGANIZED HEALTH CARE EDUCATION/TRAINING PROGRAM

## 2024-12-16 RX ORDER — PNV NO.95/FERROUS FUM/FOLIC AC 28MG-0.8MG
1 TABLET ORAL DAILY
COMMUNITY

## 2024-12-16 NOTE — PROGRESS NOTES
"Postpartum Visit  MD Layne    24    Subjective   Buffy Pope is a 34 y.o.  female who presents for a postpartum visit.         Term 10/01/24   4940 g (10 lb 14.3 oz) F CS-LTranv Spinal  Living 3 9    Name: Tami Beauchamp   Complications: Malpresentation of fetus   Location: [facility] (AN L&D)        incision: healing better, no discharge, no fevers  No vaginal bleeding     No lochia or bleeding  Bowel function is normal.   Bladder function is normal.   No dysuria or hematuria    Patient has not been sexually active.   Desired contraception method: s/p hysterectomy    Postpartum Depression: Low Risk  (2024)    [place]  Depression Scale     Last EPDS Total Score: 0     Last EPDS Self Harm Result: Never          Current Outpatient Medications:     Prenatal Vit-Fe Fumarate-FA (Prenatal Vitamin) 27-0.8 MG TABS, Take 1 tablet by mouth in the morning, Disp: , Rfl:     No Known Allergies    Review of Systems  Per HPI    Objective    /80 (BP Location: Right arm, Patient Position: Sitting)   Ht 5' 2\" (1.575 m)   Wt 85.7 kg (189 lb)   Breastfeeding Yes   BMI 34.57 kg/m²   Physical Exam  Constitutional:       Appearance: Normal appearance.   HENT:      Head: Normocephalic.   Eyes:      Extraocular Movements: Extraocular movements intact.      Conjunctiva/sclera: Conjunctivae normal.   Pulmonary:      Effort: Pulmonary effort is normal.   Abdominal:      General: There is no distension.      Palpations: Abdomen is soft.      Tenderness: There is no guarding.      Comments: Well-healed T- incision   Musculoskeletal:         General: Normal range of motion.      Cervical back: Normal range of motion.   Skin:     General: Skin is warm and dry.   Neurological:      General: No focal deficit present.      Mental Status: She is alert.   Psychiatric:         Mood and Affect: Mood normal.         Behavior: Behavior normal.         Thought Content: " Thought content normal.         Assessment/Plan:  Buffy Pope is a 34 y.o. who is postpartum from a CS c/b take back for ex-lap, supracervical hysterectomy     1. Postpartum care and examination        2. Status post abdominal supracervical hysterectomy             S/p JOSIAH, no contraception needed  RTO for annual  No Hpv vaccine, no medical insurance

## (undated) DEVICE — ENSEAL 20 CM SHAFT, LARGE JAW: Brand: ENSEAL X1

## (undated) DEVICE — ABDOMINAL PAD: Brand: DERMACEA

## (undated) DEVICE — PACK C-SECTION PBDS

## (undated) DEVICE — SUT VICRYL 0 CT-1 27 IN J260H

## (undated) DEVICE — SUT PDS II 1 XLH 96 IN LOOPED Z881G

## (undated) DEVICE — PREMIUM DRY TRAY LF: Brand: MEDLINE INDUSTRIES, INC.

## (undated) DEVICE — ANTIBACTERIAL VIOLET BRAIDED (POLYGLACTIN 910), SYNTHETIC ABSORBABLE SUTURE: Brand: COATED VICRYL

## (undated) DEVICE — ADHESIVE SKIN HIGH VISCOSITY EXOFIN 1ML

## (undated) DEVICE — SPONGE LAP 18 X 18 IN STRL RFD

## (undated) DEVICE — CHLORAPREP HI-LITE 26ML ORANGE

## (undated) DEVICE — LIGACLIP MCA MULTIPLE CLIP APPLIERS, 20 SMALL CLIPS: Brand: LIGACLIP

## (undated) DEVICE — BULB SYRINGE,IRRIGATION WITH PROTECTIVE CAP: Brand: DOVER

## (undated) DEVICE — SUT MONOCRYL 4-0 PS-2 18 IN Y496G

## (undated) DEVICE — MEDI-VAC YANK SUCT HNDL W/TPRD BULBOUS TIP: Brand: CARDINAL HEALTH

## (undated) DEVICE — SUT VICRYL 0 CTX 36 IN J978H

## (undated) DEVICE — EXOFIN PRECISION PEN HIGH VISCOSITY TOPICAL SKIN ADHESIVE: Brand: EXOFIN PRECISION PEN, 1G

## (undated) DEVICE — SKIN MARKER DUAL TIP WITH RULER CAP, FLEXIBLE RULER AND LABELS: Brand: DEVON

## (undated) DEVICE — GLOVE PI ULTRA TOUCH SZ.6.5

## (undated) DEVICE — GLOVE PI ULTRA TOUCH SZ 6

## (undated) DEVICE — SUT MONOCRYL 2-0 CT-1 27 IN Y339H

## (undated) DEVICE — SUT PLAIN 2-0 CTX 27 IN 872H

## (undated) DEVICE — GLOVE INDICATOR PI UNDERGLOVE SZ 6.5 BLUE

## (undated) DEVICE — INTENDED FOR TISSUE SEPARATION, AND OTHER PROCEDURES THAT REQUIRE A SHARP SURGICAL BLADE TO PUNCTURE OR CUT.: Brand: BARD-PARKER SAFETY BLADES SIZE 10, STERILE

## (undated) DEVICE — SUT MONOCRYL 0 CTX 36 IN Y398H

## (undated) DEVICE — DRESSING MEPILEX AG BORDER POST-OP 4 X 12 IN

## (undated) DEVICE — GAUZE SPONGES,16 PLY: Brand: CURITY

## (undated) DEVICE — SUT CHROMIC 3-0 SH 27 IN G122H

## (undated) DEVICE — DRESSING TELFA 2 X 3 IN STRL

## (undated) DEVICE — TOWEL SURG XR DETECT GREEN STRL RFD

## (undated) DEVICE — SUT VICRYL 0 CT-1 36 IN J946H

## (undated) DEVICE — Device

## (undated) DEVICE — PLUMEPEN PRO 10FT

## (undated) DEVICE — ASTOUND STANDARD SURGICAL GOWN, XL: Brand: CONVERTORS

## (undated) DEVICE — TUBING SUCTION 5MM X 12 FT

## (undated) DEVICE — SUT MONOCRYL 4-0 PS-2 27 IN Y426H

## (undated) DEVICE — MEDI-VAC YANKAUER SUCTION HANDLE W/STRAIGHT TIP & CONTROL VENT: Brand: CARDINAL HEALTH

## (undated) DEVICE — SURGICEL NU-KNIT 3 X 4

## (undated) DEVICE — BETHLEHEM UNIVERSAL LAPAROTOMY: Brand: CARDINAL HEALTH

## (undated) DEVICE — JADA VACUUM-INDUCED HEMORRAGE CNTRL SYS 2.0

## (undated) DEVICE — ANTIBACTERIAL UNDYED BRAIDED (POLYGLACTIN 910), SYNTHETIC ABSORBABLE SUTURE: Brand: COATED VICRYL

## (undated) DEVICE — HEMOSTATIC MATRIX SURGIFLO 8ML W/THROMBIN